# Patient Record
Sex: FEMALE | Race: WHITE | Employment: FULL TIME | URBAN - METROPOLITAN AREA
[De-identification: names, ages, dates, MRNs, and addresses within clinical notes are randomized per-mention and may not be internally consistent; named-entity substitution may affect disease eponyms.]

---

## 2017-12-13 ENCOUNTER — APPOINTMENT (OUTPATIENT)
Dept: CT IMAGING | Facility: CLINIC | Age: 47
DRG: 291 | End: 2017-12-13
Attending: EMERGENCY MEDICINE
Payer: COMMERCIAL

## 2017-12-13 ENCOUNTER — APPOINTMENT (OUTPATIENT)
Dept: GENERAL RADIOLOGY | Facility: CLINIC | Age: 47
DRG: 291 | End: 2017-12-13
Attending: EMERGENCY MEDICINE
Payer: COMMERCIAL

## 2017-12-13 ENCOUNTER — HOSPITAL ENCOUNTER (INPATIENT)
Facility: CLINIC | Age: 47
LOS: 6 days | Discharge: HOME | DRG: 291 | End: 2017-12-20
Attending: EMERGENCY MEDICINE | Admitting: INTERNAL MEDICINE
Payer: COMMERCIAL

## 2017-12-13 DIAGNOSIS — R06.02 SOB (SHORTNESS OF BREATH): ICD-10-CM

## 2017-12-13 DIAGNOSIS — I48.91 NEW ONSET A-FIB (H): ICD-10-CM

## 2017-12-13 DIAGNOSIS — I48.91 ATRIAL FIBRILLATION, UNSPECIFIED TYPE (H): ICD-10-CM

## 2017-12-13 DIAGNOSIS — R09.02 HYPOXIA: ICD-10-CM

## 2017-12-13 DIAGNOSIS — R06.89 HYPERCARBIA: ICD-10-CM

## 2017-12-13 LAB
ALBUMIN SERPL-MCNC: 2.9 G/DL (ref 3.4–5)
ALP SERPL-CCNC: 105 U/L (ref 40–150)
ALT SERPL W P-5'-P-CCNC: 21 U/L (ref 0–50)
ANION GAP SERPL CALCULATED.3IONS-SCNC: 6 MMOL/L (ref 3–14)
AST SERPL W P-5'-P-CCNC: 21 U/L (ref 0–45)
BASOPHILS # BLD AUTO: 0 10E9/L (ref 0–0.2)
BASOPHILS NFR BLD AUTO: 0.4 %
BILIRUB SERPL-MCNC: 1.2 MG/DL (ref 0.2–1.3)
BUN SERPL-MCNC: 14 MG/DL (ref 7–30)
CA-I BLD-SCNC: 4.5 MG/DL (ref 4.4–5.2)
CALCIUM SERPL-MCNC: 8.1 MG/DL (ref 8.5–10.1)
CHLORIDE SERPL-SCNC: 104 MMOL/L (ref 94–109)
CO2 BLDCOV-SCNC: 35 MMOL/L (ref 21–28)
CO2 SERPL-SCNC: 34 MMOL/L (ref 20–32)
CREAT SERPL-MCNC: 0.72 MG/DL (ref 0.52–1.04)
D DIMER PPP FEU-MCNC: 2.9 UG/ML FEU (ref 0–0.5)
DIFFERENTIAL METHOD BLD: ABNORMAL
EOSINOPHIL # BLD AUTO: 0.2 10E9/L (ref 0–0.7)
EOSINOPHIL NFR BLD AUTO: 1.6 %
ERYTHROCYTE [DISTWIDTH] IN BLOOD BY AUTOMATED COUNT: 18.6 % (ref 10–15)
FLUAV+FLUBV AG SPEC QL: NEGATIVE
FLUAV+FLUBV AG SPEC QL: NEGATIVE
GFR SERPL CREATININE-BSD FRML MDRD: 86 ML/MIN/1.7M2
GLUCOSE BLD-MCNC: 93 MG/DL (ref 70–99)
GLUCOSE BLDC GLUCOMTR-MCNC: 87 MG/DL (ref 70–99)
GLUCOSE SERPL-MCNC: 92 MG/DL (ref 70–99)
HCT VFR BLD AUTO: 48.1 % (ref 35–47)
HCT VFR BLD CALC: 49 %PCV (ref 35–47)
HGB BLD CALC-MCNC: 16.7 G/DL (ref 11.7–15.7)
HGB BLD-MCNC: 13.3 G/DL (ref 11.7–15.7)
IMM GRANULOCYTES # BLD: 0 10E9/L (ref 0–0.4)
IMM GRANULOCYTES NFR BLD: 0.4 %
INR PPP: 1.17 (ref 0.86–1.14)
LYMPHOCYTES # BLD AUTO: 1.5 10E9/L (ref 0.8–5.3)
LYMPHOCYTES NFR BLD AUTO: 15 %
MCH RBC QN AUTO: 23 PG (ref 26.5–33)
MCHC RBC AUTO-ENTMCNC: 27.7 G/DL (ref 31.5–36.5)
MCV RBC AUTO: 83 FL (ref 78–100)
MONOCYTES # BLD AUTO: 0.6 10E9/L (ref 0–1.3)
MONOCYTES NFR BLD AUTO: 5.9 %
NEUTROPHILS # BLD AUTO: 7.9 10E9/L (ref 1.6–8.3)
NEUTROPHILS NFR BLD AUTO: 76.7 %
NRBC # BLD AUTO: 0.2 10*3/UL
NRBC BLD AUTO-RTO: 2 /100
NT-PROBNP SERPL-MCNC: 1908 PG/ML (ref 0–450)
PCO2 BLDV: 66 MM HG (ref 40–50)
PH BLDV: 7.33 PH (ref 7.32–7.43)
PLATELET # BLD AUTO: 179 10E9/L (ref 150–450)
PO2 BLDV: 47 MM HG (ref 25–47)
POTASSIUM BLD-SCNC: 4.3 MMOL/L (ref 3.4–5.3)
POTASSIUM SERPL-SCNC: 4.4 MMOL/L (ref 3.4–5.3)
PROT SERPL-MCNC: 8 G/DL (ref 6.8–8.8)
RBC # BLD AUTO: 5.79 10E12/L (ref 3.8–5.2)
SAO2 % BLDV FROM PO2: 78 %
SODIUM BLD-SCNC: 142 MMOL/L (ref 133–144)
SODIUM SERPL-SCNC: 144 MMOL/L (ref 133–144)
SPECIMEN SOURCE: NORMAL
TROPONIN I BLD-MCNC: 0 UG/L (ref 0–0.1)
WBC # BLD AUTO: 10.3 10E9/L (ref 4–11)

## 2017-12-13 PROCEDURE — 96361 HYDRATE IV INFUSION ADD-ON: CPT | Performed by: EMERGENCY MEDICINE

## 2017-12-13 PROCEDURE — 82803 BLOOD GASES ANY COMBINATION: CPT

## 2017-12-13 PROCEDURE — 40000502 ZZHCL STATISTIC GLUCOSE ED POCT

## 2017-12-13 PROCEDURE — 85025 COMPLETE CBC W/AUTO DIFF WBC: CPT | Performed by: EMERGENCY MEDICINE

## 2017-12-13 PROCEDURE — 40000497 ZZHCL STATISTIC SODIUM ED POCT

## 2017-12-13 PROCEDURE — 99285 EMERGENCY DEPT VISIT HI MDM: CPT | Mod: 25 | Performed by: EMERGENCY MEDICINE

## 2017-12-13 PROCEDURE — 25000128 H RX IP 250 OP 636: Performed by: EMERGENCY MEDICINE

## 2017-12-13 PROCEDURE — 96374 THER/PROPH/DIAG INJ IV PUSH: CPT | Mod: 59 | Performed by: EMERGENCY MEDICINE

## 2017-12-13 PROCEDURE — 85379 FIBRIN DEGRADATION QUANT: CPT | Performed by: EMERGENCY MEDICINE

## 2017-12-13 PROCEDURE — 71260 CT THORAX DX C+: CPT

## 2017-12-13 PROCEDURE — 84484 ASSAY OF TROPONIN QUANT: CPT

## 2017-12-13 PROCEDURE — 71010 XR CHEST PORT 1 VW: CPT

## 2017-12-13 PROCEDURE — 00000146 ZZHCL STATISTIC GLUCOSE BY METER IP

## 2017-12-13 PROCEDURE — 87804 INFLUENZA ASSAY W/OPTIC: CPT | Performed by: EMERGENCY MEDICINE

## 2017-12-13 PROCEDURE — 93010 ELECTROCARDIOGRAM REPORT: CPT | Mod: Z6 | Performed by: EMERGENCY MEDICINE

## 2017-12-13 PROCEDURE — 40000498 ZZHCL STATISTIC POTASSIUM ED POCT

## 2017-12-13 PROCEDURE — 85610 PROTHROMBIN TIME: CPT | Performed by: EMERGENCY MEDICINE

## 2017-12-13 PROCEDURE — 25000125 ZZHC RX 250: Performed by: EMERGENCY MEDICINE

## 2017-12-13 PROCEDURE — 82330 ASSAY OF CALCIUM: CPT

## 2017-12-13 PROCEDURE — 40000501 ZZHCL STATISTIC HEMATOCRIT ED POCT

## 2017-12-13 PROCEDURE — 80053 COMPREHEN METABOLIC PANEL: CPT | Performed by: EMERGENCY MEDICINE

## 2017-12-13 PROCEDURE — 83880 ASSAY OF NATRIURETIC PEPTIDE: CPT | Performed by: EMERGENCY MEDICINE

## 2017-12-13 PROCEDURE — 93005 ELECTROCARDIOGRAM TRACING: CPT | Mod: 76 | Performed by: EMERGENCY MEDICINE

## 2017-12-13 RX ORDER — IOPAMIDOL 755 MG/ML
100 INJECTION, SOLUTION INTRAVASCULAR ONCE
Status: COMPLETED | OUTPATIENT
Start: 2017-12-13 | End: 2017-12-14

## 2017-12-13 RX ORDER — ALBUTEROL SULFATE 0.83 MG/ML
2.5 SOLUTION RESPIRATORY (INHALATION)
Status: DISCONTINUED | OUTPATIENT
Start: 2017-12-13 | End: 2017-12-14 | Stop reason: CLARIF

## 2017-12-13 RX ORDER — IPRATROPIUM BROMIDE AND ALBUTEROL SULFATE 2.5; .5 MG/3ML; MG/3ML
3 SOLUTION RESPIRATORY (INHALATION) ONCE
Status: COMPLETED | OUTPATIENT
Start: 2017-12-13 | End: 2017-12-13

## 2017-12-13 RX ORDER — DILTIAZEM HYDROCHLORIDE 5 MG/ML
10 INJECTION INTRAVENOUS ONCE
Status: COMPLETED | OUTPATIENT
Start: 2017-12-13 | End: 2017-12-13

## 2017-12-13 RX ADMIN — DILTIAZEM HYDROCHLORIDE 10 MG: 5 INJECTION INTRAVENOUS at 23:16

## 2017-12-13 RX ADMIN — SODIUM CHLORIDE 500 ML: 9 INJECTION, SOLUTION INTRAVENOUS at 22:52

## 2017-12-13 RX ADMIN — IPRATROPIUM BROMIDE AND ALBUTEROL SULFATE 3 ML: .5; 3 SOLUTION RESPIRATORY (INHALATION) at 22:49

## 2017-12-13 ASSESSMENT — ENCOUNTER SYMPTOMS
FEVER: 0
COUGH: 1
ABDOMINAL DISTENTION: 1
PALPITATIONS: 0
CHILLS: 0
SHORTNESS OF BREATH: 1

## 2017-12-13 NOTE — IP AVS SNAPSHOT
[address] [address]    Phone:  [phone]                                       After Visit Summary   12/13/2017    Courtney Anderson    MRN: [id number]           After Visit Summary Signature Page     I have received my discharge instructions, and my questions have been answered. I have discussed any challenges I see with this plan with the nurse or doctor.    ..........................................................................................................................................  Patient/Patient Representative Signature      ..........................................................................................................................................  Patient Representative Print Name and Relationship to Patient    ..................................................               ................................................  Date                                            Time    ..........................................................................................................................................  Reviewed by Signature/Title    ...................................................              ..............................................  Date                                                            Time

## 2017-12-13 NOTE — IP AVS SNAPSHOT
MRN:[id number]                      After Visit Summary   12/13/2017    Courtney Anderson    MRN: [id number]           Thank you!     Thank you for choosing [facility] for your care. Our goal is always to provide you with excellent care. Hearing back from our patients is one way we can continue to improve our services. Please take a few minutes to complete the written survey that you may receive in the mail after you visit with us. Thank you!        Patient Information     Date Of Birth          1970        Designated Caregiver       Most Recent Value    Caregiver    Will someone help with your care after discharge? yes    Name of designated caregiver Tamra    Phone number of caregiver [phone]    Caregiver address [address] [facility] [address]      About your hospital stay     You were admitted on:  December 14, 2017 You last received care in the:  [address] [facility]    You were discharged on:  December 20, 2017        Reason for your hospital stay       New onset HF, new onset afib                  Who to Call     For medical emergencies, please call 911.  For non-urgent questions about your medical care, please call your primary care provider or clinic, [phone]          Attending Provider     Provider Specialty    Tressa Ricketts MD Emergency Medicine    ONinfa, Yaquelin Wilkinson MD Emergency Medicine    Can, MD Danny Cardiology       Primary Care Provider Office Phone # Fax #    Steph KARIN Carter [phone]      After Care Instructions     Activity       Your activity upon discharge: activity as tolerated            Diet       Follow this diet upon discharge: Orders Placed This Encounter      Low Saturated Fat Na <2400 mg            Discharge Instructions       Please follow up with your PCP within 1 week with labs. Please follow up with imaging for a cardiac MRI.            Oxygen Adult       [facility] Order 2 liter(s) by nasal cannula continuously  with use of portable tank. Expected treatment length is indefinite (99 months).. Test on conserving device as applicable.    Patients who qualify for home O2 coverage under the CMS guidelines require ABG tests or O2 sat readings obtained closest to, but no earlier than 2 days prior to the discharge, as evidence of the need for home oxygen therapy. Testing must be performed while patient is in the chronic stable state. See notes for O2 sats.    I certify that this patient, Courtney Anderson has been under my care and that I, or a nurse practitioner or physician's assistant working with me, had a face-to-face encounter that meets the face-to-face encounter requirements with this patient on 12/20/2017. The patient, Courtney Anderson was evaluated or treated in whole, or in part, for the following medical condition, which necessitates the use of the ordered oxygen. Treatment Diagnosis: CHF    Attending Provider: Wilber Rhoeds MD  Physician signature: See electronic signature associated with these discharge orders  Date of Order: December 20, 2017                  Follow-up Appointments     Adult [facility] Follow-up and recommended labs and tests       Follow up with primary care provider, Steph Carter, within 7 days for hospital follow- up.  The following labs/tests are recommended: BMP.      Appointments on [place] and/or [facility] (with [facility] or [facility] provider or service). Call [phone] if you haven't heard regarding these appointments within 7 days of discharge.            Follow Up and recommended labs and tests       1. Follow up with [facility] clinic as scheduled  2. Follow up with imaging for cardiac MRI  3. Follow up with pulmonology for sleep evaluation                  Your next 10 appointments already scheduled     Dec 29, 2017  8:30 AM CST   Lab with  LAB    Health [facility] ([facility])    [address] [place] [address] [phone]            Dec 29, 2017   9:00 AM CST   (Arrive by 8:45 AM)   CORE NEW with Jolynn El NP   [facility] ([facility])    [address] [facility] [address] [phone]              Additional Services     Inr Clinic Referral           Pulmonary Medicine Referral       Concern for Obstructive Sleep Apnea, please evaulate                  Future tests that were ordered for you     Basic metabolic panel           MRI Cardiac w/contrast                 Further instructions from your care team       INR draw  Friday 12/22/17  [facility]    Lab  [address] [facility] [address]    Oxygen Provider:  Arranged through [employer], contact number [phone].  If you have any questions for concerns please call the oxygen company directly.          [facility]  [phone]     Fax #[phone] [address] [facility] [address]    Dr Santi Carter     12/26/17   10am  INR management    Goal INR 2-3    Warfarin Instruction     You have started taking a medicine called warfarin. This is a blood-thinning medicine (anticoagulant). It helps prevent and treat blood clots.      Before leaving the hospital, make sure you know how much to take and how long to take it.      You will need regular blood tests to make sure your blood is clotting safely. It is very important to see your doctor for regular blood tests.    Talk to your doctor before taking any new medicine (this includes over-the-counter drugs and herbal products). Many medicines can interact with warfarin. This may cause more bleeding or too much clotting.     Eating a lot of vitamin K--found in green, leafy vegetables--can change the way warfarin works in your body. Do NOT avoid these foods. Instead, try to eat the same amount each day.     Bleeding is the most common side-effect of warfarin. You may notice bleeding gums, a bloody nose, bruises and bleeding longer when you cut yourself. See a doctor  "at once if:   o You cough up blood  o You find blood in your stool (poop)  o You have a deep cut, or a cut that bleeds longer than 10 minutes   o You have a bad cut, hard fall, accident or hit your head (go to urgent care or the emergency room).    For women who can get pregnant: This medicine can harm an unborn baby. Be very careful not to get pregnant while taking this medicine. If you think you might be pregnant, call your doctor right away.    For more information, read \"Guide to Warfarin Therapy,  the booklet you received in the hospital.        General Recommendations To Control Heart Failure When You Get Home       Heart Failure Instructions for Patients and Families: Please read and check off each of these important instructions as you do them when you get home.          Weight and Symptoms       ___ Put a scale in your bathroom.    ___ Post a weight chart or calendar next to your scale.    ___ Weigh yourself everyday as soon as you get up in the morning (before breakfast).  You should only be wearing your pajamas.  Write your weight on the chart/calendar.  ___ Bring your weight chart/calendar with you to all appointments.  ___ Call your doctor or nurse practitioner if you gain 2 pounds (in 1 day) or 5 pounds in (1 week) from your goal  good  weight.  Your good weight is also called your  dry  weight.  Your doctor or nurse will tell you what your good weight should be.    ___ Call your doctor or nurse practitioner if you have shortness of breath that gets worse over time, leg swelling or fatigue.       Medications and Diet       ___ Make sure to take your medication as prescribed.    ___ Bring a current list of your medication and all of your medicine bottles with you to all appointments.    ___ Limit fluids if you still have swelling or shortness of breath, or if your doctor tells you to do so.    ___ Eat less than 2000 mg of sodium (salt) every day. Read food labels, and do not add salt to meals. Remember, " if you eat less salt you retain less fluid.  ___ Follow a heart healthy diet that is low in saturated fat.        Activity and Suggested Lifestyle Changes      ___ Stay active. Talk to your doctor about an exercise program that is safe for your heart.  ___ Stop smoking. Reduce alcohol use.      ___ Lose weight if you are overweight. Extra weight puts a lot of stress on the heart.                 Control for Leg Swelling     ___ Keep your legs elevated (raised) as needed for swelling. If swelling is uncomfortable or elevation doesn t help, ask your doctor about using ACE wraps or support stockings.        What is the [facility].O.R[facility]E. Clinic?  Cardiomyopathy  Optimization  Rehabilitation  Education    The [facility].R[facility] Clinic is a heart failure specialty clinic within [facility].  It is an outpatient disease management program that is based on a phase-by-phase approach, which is tailored to each patient s individual needs.  The cardiologist, nurse practitioner, physician assistant and nurses provide an ongoing outpatient care and treatment plan that guides heart failure and cardiomyopathy patients from evaluation and education to stabilization. This team works with your current primary care doctor and cardiologist to help you:    - Avoid hospitalizations  - Slow the progression of the disease  - Improve length and quality of life  - Know who and when to call if heart failure symptoms appear  - Receive easy access to quality health care and advice  - Better understand your condition and treatment  - Decrease the tremendous cost burden of heart failure care  - Detect future heart problems before they become life threatening                                 Follow-up Appointments     ___ An appointment with the [facility] Clinic may already have been made for you (see section   Your next appointments already scheduled ).  If you have a question about your appointment, would like to speak with a C.O.R.E. nurse, or would like to  "become a [facility].O.R.E. Clinic patient, please call one of the following locations:     - [facility] ([place]):                                             [phone]  - [facility] ([place]):                                            [phone]  - [facility] ([place]):                 [phone]      ___ Your C.O.R.E. Clinic Team will continue to educate you on your heart failure and may adjust medications based on your vital signs, lab work, and how you are feeling.  Therefore, it is very important to bring the following to all C.O.R.E. appointments:    - An accurate list of your medications  - Your medicine bottles  - Your weight chart/calendar                   Pending Results     No orders found from 12/11/2017 to 12/14/2017.            Statement of Approval     Ordered          12/20/17 0942  I have reviewed and agree with all the recommendations and orders detailed in this document.  EFFECTIVE NOW     Approved and electronically signed by:  Josef Nicholson MD             Admission Information     Date & Time Department Dept. Phone    12/13/2017 [address] [facility] [phone]      Your Vitals Were     Blood Pressure Pulse Temperature Respirations Height Weight    93/71 (BP Location: Right arm) 80 97.5  F (36.4  C) (Oral) 20 1.626 m (5' 4\") 166.4 kg (366 lb 14.4 oz)    Pulse Oximetry BMI (Body Mass Index)                91% 62.98 kg/m2          MyChart Information     [email or web address] lets you send messages to your doctor, view your test results, renew your prescriptions, schedule appointments and more. To sign up, go to [email or web address] . Click on \"Log in\" on the left side of the screen, which will take you to the Welcome page. Then click on \"Sign up Now\" on the right side of the page.     You will be asked to enter the access code listed below, as well as some personal information. Please follow the directions to create your username " and password.     Your access code is: [email or web address]  Expires: 3/19/2018  7:30 AM     Your access code will  in 90 days. If you need help or a new code, please call your [place] clinic or [phone].        Care EveryWhere ID     This is your Care EveryWhere ID. This could be used by other organizations to access your [place] medical records  [id number]        Equal Access to Services     ANTOINE GANDHI : Hadii aad ku hadasho Soomaali, waaxda luqadaha, qaybta kaalmada adeegyada, vincenzo westin haygildardoderic gallegosarahkaren chavez . So [facility] [phone].    ATENCIÓN: Si habla español, tiene a shukla disposición servicios gratuitos de asistencia lingüística. Llame al [phone].    We comply with applicable federal civil rights laws and [place] laws. We do not discriminate on the basis of race, color, national origin, age, disability, sex, sexual orientation, or gender identity.               Review of your medicines      START taking        Dose / Directions    acetaZOLAMIDE 250 MG tablet   Commonly known as:  DIAMOX   Used for:  Hypoxia        Dose:  250 mg   Take 1 tablet (250 mg) by mouth daily   Quantity:  30 tablet   Refills:  0       * furosemide 40 MG tablet   Commonly known as:  LASIX   Used for:  SOB (shortness of breath)        Dose:  40 mg   Take 1 tablet (40 mg) by mouth every 24 hours   Quantity:  30 tablet   Refills:  0       * furosemide 80 MG tablet   Commonly known as:  LASIX   Used for:  SOB (shortness of breath)        Dose:  80 mg   Take 1 tablet (80 mg) by mouth daily   Quantity:  30 tablet   Refills:  0       levalbuterol 1.25 MG/3ML neb solution   Commonly known as:  XOPENEX   Used for:  Hypoxia        Dose:  1.25 mg   Take 3 mLs (1.25 mg) by nebulization every 4 hours as needed for wheezing   Quantity:  270 mL   Refills:  0       metoprolol 50 MG tablet   Commonly known as:  LOPRESSOR   Used for:  Atrial fibrillation, unspecified type (H)        Dose:  50 mg   Take 1 tablet (50 mg) by mouth 2  times daily   Quantity:  60 tablet   Refills:  0       Potassium Chloride ER 20 MEQ Tbcr   Used for:  Hypoxia        Dose:  20 mEq   Take 1 tablet (20 mEq) by mouth daily   Quantity:  30 tablet   Refills:  0       warfarin 5 MG tablet   Commonly known as:  COUMADIN   Used for:  New onset a-fib (H)        Dose:  5 mg   Take 1 tablet (5 mg) by mouth daily   Quantity:  30 tablet   Refills:  0       * Notice:  This list has 2 medication(s) that are the same as other medications prescribed for you. Read the directions carefully, and ask your doctor or other care provider to review them with you.         Where to get your medicines      These medications were sent to [facility] [place], [place] - [address]  [address] [facility] [address]     Phone:  [phone]     acetaZOLAMIDE 250 MG tablet    furosemide 40 MG tablet    furosemide 80 MG tablet    levalbuterol 1.25 MG/3ML neb solution    metoprolol 50 MG tablet    Potassium Chloride ER 20 MEQ Tbcr    warfarin 5 MG tablet                Protect others around you: Learn how to safely use, store and throw away your medicines at www.disposemymeds.org.             Medication List: This is a list of all your medications and when to take them. Check marks below indicate your daily home schedule. Keep this list as a reference.      Medications           Morning Afternoon Evening Bedtime As Needed    acetaZOLAMIDE 250 MG tablet   Commonly known as:  DIAMOX   Take 1 tablet (250 mg) by mouth daily   Last time this was given:  250 mg on 12/20/2017  9:32 AM                                   * furosemide 40 MG tablet   Commonly known as:  LASIX   Take 1 tablet (40 mg) by mouth every 24 hours   Last time this was given:  40 mg on 12/20/2017 12:06 PM                                   * furosemide 80 MG tablet   Commonly known as:  LASIX   Take 1 tablet (80 mg) by mouth daily   Last time this was given:  40 mg on 12/20/2017 12:06 PM                                    levalbuterol 1.25 MG/3ML neb solution   Commonly known as:  XOPENEX   Take 3 mLs (1.25 mg) by nebulization every 4 hours as needed for wheezing   Last time this was given:  1.25 mg on 12/16/2017  9:29 AM                                   metoprolol 50 MG tablet   Commonly known as:  LOPRESSOR   Take 1 tablet (50 mg) by mouth 2 times daily   Last time this was given:  50 mg on 12/20/2017  9:33 AM                                      Potassium Chloride ER 20 MEQ Tbcr   Take 1 tablet (20 mEq) by mouth daily                                   warfarin 5 MG tablet   Commonly known as:  COUMADIN   Take 1 tablet (5 mg) by mouth daily   Last time this was given:  7.5 mg on 12/19/2017  6:37 PM                                   * Notice:  This list has 2 medication(s) that are the same as other medications prescribed for you. Read the directions carefully, and ask your doctor or other care provider to review them with you.              More Information      Weight Chart  For Patients with Heart Failure  Instructions: Weigh yourself every morning at the same time, on the same scale and in the same clothing. Write the amount on the weight chart. Bring the chart with you to your clinic visits.  Call your doctor if you:    Gain more than 2 pounds in one day or 5 pounds in one week.    Have increased shortness of breath.    Wake up short of breath or cannot sleep lying down.    Have increased swelling in your legs, ankles or abdomen (belly).   SUN MON TUES WED THURS FRI SAT   Date Weight                    Date Weight                    Date Weight                    Date Weight                    Date Weight                    Date Weight                    Date Weight                    Date Weight                    Date Weight                    Date Weight                    Date Weight                    Instructions: Weigh yourself every morning at the same time, on the same scale and in the same clothing.  Write the amount on the weight chart. Bring the chart with you to your clinic visits.   SUN MON TUES WED THURS FRI SAT   Date Weight                    Date Weight                    Date Weight                    Date Weight                    Date Weight                    Date Weight                    Date Weight                    Date Weight                    Date Weight                    Date Weight                    Date Weight                    Date Weight                    Date Weight                    Date Weight                    Date Weight                    For informational purposes only. Not to replace the advice of your health care provider. Copyright   2014 [facility]. All rights reserved. [employer] 457586 - REV 03/16.            Heart Failure: Know Your Baselines     Your healthcare provider can help you come up with baselines for measuring your symptoms.   The first step to managing heart failure symptoms is getting to know what s normal for you. How much can you usually do before shortness of breath is a problem? Do your socks and shoes fit comfortably? How much do you weigh? How your symptoms usually feel are your baselines. Knowing what s normal for you will help you see when symptoms are getting worse. You ll know because you won t feel normal anymore. Worsening symptoms means your heart is under stress. It is having a hard time pumping blood to the rest of your body and your body may be retaining fluid. Write some baselines in the box below. These will help you measure your symptoms.  Watch for changes  Once you ve come up with baselines, watch for changes daily. Pay attention to how much you can do today. Is it the same as yesterday? Are your shoes tight? Do you need to use a different belt hole? Can you lie flat in bed to sleep without feeling that you are suffocating? Can you eat without feeling full too soon, or short of breath? Are you gaining weight but eating  the same amount?  If today s symptoms are different from your baselines, you need to take action. The problem won t go away by itself. So, if you notice even a small change, don t ignore it. Your healthcare provider is counting on you to call when you think your symptoms are worse. He or she will tell you what to do next. Working together this way helps keep heart failure under control and improves the number of good days you have. It could even keep you out of the hospital.    Date Last Reviewed: 7/1/2016 2000-2017 [employer]. [address] [place] [address]. All rights reserved. This information is not intended as a substitute for professional medical care. Always follow your healthcare professional's instructions.                Heart Failure: Tracking Your Weight  You have a condition called heart failure. When you have heart failure, a sudden weight gain or a steady rise in weight is a warning sign that your body is retaining too much water and salt. This could mean your heart failure is getting worse. If left untreated, it can cause problems for your lungs and result in shortness of breath. Weighing yourself each day is the best way to know if you re retaining water. If your weight goes up quickly, call your doctor. You will be given instructions on how to get rid of the excess water. You will likely need medicines and to avoid salt. This will help your heart work better.  Call your doctor if you gain more than 2 pounds in 1 day, more than 5 pounds in 1 week, or whatever weight gain you were told to report by your doctor. This is often a sign of worsening heart failure and needs to be evaluated and treated. Your doctor will tell you what to do next.   Tips for weighing yourself      Weigh yourself at the same time each morning, wearing the same clothes. Weigh yourself after urinating and before eating.    Use the same scale each day. Make sure the numbers are easy to read. Put the  scale on a flat, hard surface -- not on a rug or carpet.    Do not stop weighing yourself. If you forget one day, weigh again the next morning.  How to use your weight chart    Keep your weight chart near the scale. Write your weight on the chart as soon as you get off the scale.    Fill in the month and the start date on the chart. Then write down your weight each day. Your chart will look like this:      If you miss a day, leave the space blank. Weigh yourself the next day and write your weight in the next space.    Take your weight chart with you when you go to see your doctor.  Date Last Reviewed: 3/20/2016    [id number] [employer]. [address] [place] [address], [place] [address]. All rights reserved. This information is not intended as a substitute for professional medical care. Always follow your healthcare professional's instructions.                Heart Failure: Warning Signs of a Flare-Up  You have a condition called heart failure. Once you have heart failure, flare-ups can happen. Below are signs that can mean your heart failure is getting worse. If you notice any of these warning signs, call your healthcare provider.  Swelling      Your feet, ankles, or lower legs get puffier.    You notice skin changes on your lower legs.    Your shoes feel too tight.    Your clothes are tighter in the waist.    You have trouble getting rings on or off your fingers.  Shortness of breath    You have to breathe harder even when you re doing your normal activities or when you re resting.    You are short of breath walking up stairs or even short distances.    You wake up at night short of breath or coughing.    You need to use more pillows or sit up to sleep.    You wake up tired or restless.  Other warning signs    You feel weaker, dizzy, or more tired.    You have chest pain or changes in your heartbeat.    You have a cough that won t go away.    You can t remember things or don t feel like eating.  Tracking your  weight  Gaining weight is often the first warning sign that heart failure is getting worse. Gaining even a few pounds can be a sign that your body is retaining excess water and salt. Weighing yourself each day in the morning after you urinate and before you eat, is the best way to know if you're retaining water. Get a scale that is easy to read and make sure you wear the same clothes and use the same scale every time you weigh. Your healthcare provider will show you how to track your weight. Call your doctor if you gain more than 2 pounds in 1 day, 5 pounds in 1 week, or whatever weight gain you were told to report by your doctor. This is often a sign of worsening heart failure and needs to be evaluated and treated before it compromises your breathing. Your doctor will tell you what to do next.    Date Last Reviewed: 3/15/2016    5558-6976 [employer]. [address] [place] [address]. All rights reserved. This information is not intended as a substitute for professional medical care. Always follow your healthcare professional's instructions.                Taking Medicine to Control Heart Failure  The heart is a muscle that pumps oxygen-rich blood to all parts of the body. When you have heart failure, the heart is not able to pump as well as it should. Blood and fluid may back up into the lungs (congestive heart failure), and some parts of the body don t get enough oxygen-rich blood to work normally. These problems lead to the symptoms of heart failure.  Medicines can help your heart work better, but follow your doctor's directions exactly to make sure they work as they should.     Have all your prescriptions filled. Talk to a pharmacist if you have questions.     Why take your medicines?    They help you feel better. That means you can do more of the things you enjoy.    They help your heart work better.    They can help you stay out of the hospital.    They can prevent shortness of breath  and swelling in your feet.    They can improve blood flow to the rest of your body and prevent other organs from being affected by your heart's decreased function.  Know your medicines  You may take one or more of the medicines below. Be sure you know which ones you take:    ACE inhibitors lower blood pressure and ease strain on the heart. This makes it easier for the heart to pump. These also help remodel the heart, which can help your heart pump better.    Angiotensin receptor blockers (ARBs) work like ACE inhibitors. These are prescribed for some people who can't take ACE inhibitors. Some people who take ACE inhibitors develop a cough.    Angiotensin receptor neprilysin inhibitors (ARNIs) a new medicine that combines an ARB and a neprilysin inhibitor. It helps to relax blood vessels, reduce stress on the heart, and help your body get rid of salt and fluid.    Beta-blockers help lower blood pressure and slow your heart rate. This eases the work your heart has to do. Beta-blockers may improve the heart s pumping action and strength over time. If you have severe pulmonary disease, you may not be able to take these medicines.    Diuretics ( water pills ) help the body get rid of extra water by excreting salt. This helps prevent swelling, especially in your ankles. They can also help you breathe better if you have fluid in your lungs. Having less fluid to pump means your heart doesn t have to work as hard. A side effect of this medicine is having to urinate more often. Some diuretics make your body lose a mineral called potassium. Your doctor will tell you if you need to take supplements or eat more foods high in potassium.    Digoxin helps your heart pump with more strength. This helps your heart pump more blood with each beat. So more oxygen-rich blood travels to the rest of the body.    Aldosterone blockers help change hormones and ease strain on the heart.    Hydralazine and nitrates are two separate medicines  used together to treat heart failure. They may come in one  combination  pill. They lower blood pressure and decrease how hard the heart has to pump.  Tips for taking your medicine    Take your medicines exactly as directed. Follow the directions on the label.    Take your medicines at the same time or times each day.    If you miss a dose, take it as soon as you remember--unless it s almost time for your next dose. If so, skip the missed dose. Don't take a double dose.    Never change the dose or stop taking a medicine unless your doctor tells you to. Tell your doctor if you don't understand how to take your medicines, or if you are having difficulty getting your medicines.    If you miss too many doses, you are at risk for being admitted to the hospital for shortness of breath and worsening of heart failure symptoms.  Date Last Reviewed: 4/1/2016 2000-2017 The [employer]. [address] [place] [address], [place] [id number]. All rights reserved. This information is not intended as a substitute for professional medical care. Always follow your healthcare professional's instructions.                Heart Failure: Making Changes to Your Diet  You have a condition called heart failure. When you have heart failure, excess fluid is more likely to build up in your body because your heart isn't working well. This makes the heart work harder to pump blood. Fluid buildup causes symptoms such as shortness of breath and swelling (edema). This is often referred to as congestive heart failure or CHF. Controlling the amount of salt (sodium) you eat may help stop fluid from building up. Your doctor may also tell you to reduce the amount of fluid you drink.  Reading food labels    Your healthcare provider will tell you how much sodium you can eat each day. Read food labels to keep track. Keep in mind that certain foods are high in salt. These include canned, frozen, and processed foods. Check the amount of sodium in each  serving. Watch out for high-sodium ingredients. These include MSG (monosodium glutamate), baking soda, and sodium phosphate.   Eating less salt  Give yourself time to get used to eating less salt. It may take a little while. Here are some tips to help:    Take the saltshaker off the table. Replace it with salt-free herb mixes and spices.    Eat fresh or plain frozen vegetables. These have much less salt than canned vegetables.    Choose low-sodium snacks like sodium-free pretzels, crackers, or air-popped popcorn.    Don t add salt to your food when you re cooking. Instead, season your foods with pepper, lemon, garlic, or onion.    When you eat out, ask that your food be cooked without added salt.    Avoid eating fried foods as these often have a great deal of salt.  If you re told to limit fluids  You may need to limit how much fluid you have to help prevent swelling. This includes anything that is liquid at room temperature, such as ice cream and soup. If your doctor tells you to limit fluid, try these tips:    Measure drinks in a measuring cup before you drink them. This will help you meet daily goals.    Chill drinks to make them more refreshing.    Suck on frozen lemon wedges to quench thirst.    Only drink when you re thirsty.    Chew sugarless gum or suck on hard candy to keep your mouth moist.    Weigh yourself daily to know if your body's fluid content is rising.  My sodium goal  Your healthcare provider may give you a sodium goal to meet each day. This includes sodium found in food as well as salt that you add. My goal is to eat no more than ___________ mg of sodium per day.     When to call your doctor  Call your doctor right away if you have any symptoms of worsening heart failure. These can include:    Sudden weight gain    Increased swelling of your legs or ankles    Trouble breathing when you re resting or at night    Increase in the number of pillows you have to sleep on    Chest pain, pressure,  discomfort, or pain in the jaw, neck, or back   Date Last Reviewed: 3/21/2016    6043-3181 The [employer]. [address] [place] [address], [place] [address]. All rights reserved. This information is not intended as a substitute for professional medical care. Always follow your healthcare professional's instructions.                Heart Failure: Evaluating Your Heart  You have a condition called heart failure. To evaluate your condition, your doctor will examine you, ask questions, and do some tests. Along with looking for signs of heart failure, the doctor looks for any other health problems that may have led to heart failure. The results of your evaluation will help your doctor form a treatment plan.  Health history and physical exam  Your visit will start with a health history. Tell the doctor about any symptoms you ve noticed and about all medicines you take. Then you ll have a physical exam. This includes listening to your heartbeat and breathing. You ll also be checked for swelling (edema) in your legs and neck. When you have fluid buildup or fluid in the lungs, it may be called congestive heart failure.  Diagnosing heart failure     During an echocardiogram, sound waves bounce off the heart. These are converted into a picture on the screen.   The following may be done to help your doctor form a diagnosis:    X-rays show the size and shape of your heart. These pictures can also show fluid in your lungs.    An electrocardiogram (ECG or EKG) shows the pattern of your heartbeat. Small pads (electrodes) are placed on your chest, arms, and legs. Wires connect the pads to the ECG machine, which records your heart s electrical signals. This can give the doctor information about heart function.    An echocardiogram uses ultrasound waves to show the structure and movement of your heart muscle. This shows how well the heart pumps. It also shows the thickness of the heart walls, and if the heart is enlarged. It is one  of the most useful, non-invasive tests as it provides information about the heart's general function. This helps your doctor make treatment decisions.    Lab tests evaluate small amounts of blood or urine for signs of problems. A BNP lab test can help diagnose and evaluate heart failure. BNP stands for B-type natriuretic peptide. The ventricles secrete more BNP when heart failure worsens. Lab tests can also provide information about metabolic dysfunction or heart dysfunction.  Your treatment plan  Based on the results of your evaluation and tests, your doctor will develop a treatment plan. This plan is designed to relieve some of your heart failure symptoms and help make you more comfortable. Your treatment plan may include:    Medicine to help your heart work better and improve your quality of life    Changes in what you eat and drink to help prevent fluid from backing up in your body    Daily monitoring of your weight and heart failure symptoms to see how well your treatment plan is working    Exercise to help you stay healthy    Help with quitting smoking    Emotional and psychological support to help adjust to the changes    Referrals to other specialists to make sure you are being treated comprehensively  Date Last Reviewed: 3/21/2016    [id number] The [employer]. [address] [place] [address]. All rights reserved. This information is not intended as a substitute for professional medical care. Always follow your healthcare professional's instructions.                Heart Failure: Being Active  You have a condition called heart failure. Being active doesn t mean that you have to wear yourself out. Even a little movement each day helps to strengthen your heart. If you can t get out to exercise, you can do simple stretching and strengthening exercises at home. These are good ways to keep you well-conditioned and prevent you and your heart from becoming excessively weak.    Ideas to get you  started    Add a little movement to things you do now. Walk to mail letters. Park your car at the far end of the parking lot and walk to the store. Walk up a flight of stairs instead of taking the elevator.    Choose activities you enjoy. You might walk, swim, or ride an exercise bike. Things like gardening and washing the car count, too. Other possibilities include: washing dishes, walking the dog, walking around the mall, and doing aerobic activities with friends.    Join a group exercise program at a [facility] or [facility], a senior center, or a community center. Or look into a hospital cardiac rehabilitation program. Ask your doctor if you qualify.  Tips to keep you going    Get up and get dressed each day. Go to a coffee shop and read a newspaper or go somewhere that you'll be in the presence of other active people. You ll feel more like being active.    Make a plan. Choose one or more activities that you enjoy and that you can easily do. Then plan to do at least one each day. You might write your plan on a calendar.    Go with a friend or a group if you like company. This can help you feel supported and stay motivated, too.    Plan social events that you enjoy. This will keep you mentally engaged as well as physically motivated to do things you find pleasure in.  For your safety    Talk with your healthcare provider before starting an exercise program.    Exercise indoors when it s too hot or too cold outside, or when the air quality is poor. Try walking at a shopping mall.    Wear socks and sturdy shoes to maintain your balance and prevent falls.    Start slowly. Do a few minutes several times a day at first. Increase your time and speed little by little.    Stop and rest whenever you feel tired or get short of breath.    Don t push yourself on days when you don t feel well.  Date Last Reviewed: 3/20/2016    [id number] [employer]. [address] [facility], [place] [address]. All rights reserved. This  information is not intended as a substitute for professional medical care. Always follow your healthcare professional's instructions.                Left- or Right- Side Congestive Heart Failure (CHF)    The heart is a large muscle. It is a pump that circulates blood throughout the body. Blood carries oxygen to all of the organs, including the brain, muscles, and skin. After your body takes the oxygen out of the blood, the blood returns to the heart. The right side of the heart collects the blood from the body and pumps it to the lungs. In the lungs, it gets fresh oxygen and gives up carbon dioxide. The oxygen-rich blood from the lungs then returns to the left side of the heart, where it is pumped back out to the rest of your body, starting the process all over.  Congestive heart failure (CHF) occurs when the heart muscle is weakened. This affects the pumping action of the heart. Heart failure can affect the right side of the heart or the left side. But heart failure may affect not only the right side of the heart or only the left side. Although it may have started on one side, it can and often eventually does affect both sides.  Right-side heart failure  When the right side of the heart is weakened, it can t handle the blood it is getting from the rest of the body. This blood returns to the heart through veins. When too much pressure builds up in the veins, fluid leaks out into the tissues. Gravity then causes that fluid to move to those parts of the body that are the lowest. So one of the first symptoms of right-side CHF can include swelling in the feet and ankles. If the condition gets worse, the swelling can even go up past the knees. Sometimes it gets so severe, the liver can get congested as well.  Left-side heart failure  When the left side of the heart is weakened, it can t handle the blood it gets from the lungs. Pressure then builds up in the veins of the lungs, causing fluid to leak into the lung tissues.  This may cause CHF and pulmonary edema. This causes you to feel short of breath, weak, or dizzy. These symptoms are often worse with exertion, such as when climbing stairs or walking up hills. Lying with your head flat is uncomfortable and can make your breathing worse. This may make sleeping difficult. You may need to use extra pillows to elevate your upper body to sleep well. The same is true when just resting during the daytime.  There are many causes of heart failure including:    Coronary artery disease    Past heart attack (also known as acute myocardial infarction, or AMI)    High blood pressure    Damaged heart valve    Diabetes    Obesity    Cigarette smoking    Alcohol abuse  Heart failure is a chronic condition. There is no cure. The purpose of medical treatment is to improve the pumping action of the heart. The main way to do this is to remove excess water from the body. A number of medicines can help reach this goal, improve symptoms, and prevent the heart from becoming weaker. Sometimes, heart failure can become so severe that a device is placed in the heart to help with pumping. Another major goal is to better treat the causes of heart failure, such as diabetes and high blood pressure, by making changes in your lifestyle and maximizing medical control when needed.  Home care  Follow these guidelines when caring for yourself at home:    Check your weight every day. This is very important because a sudden increase in weight gain could mean worsening heart failure. Keep these things in mind:    Use the same scale every day.    Weigh yourself at the same time every day.    Make sure the scale is on a hard floor surface, not on a rug or carpet.    Keep a record of your weight every day so your healthcare provider can see it. If you are not given a log sheet for this, keep a separate journal for this purpose.     Cut back on the amount of salt (sodium) you eat. Follow your healthcare provider's  recommendation on how much salt or sodium you should have each day.    Avoid high-salt foods. These include olives, pickles, smoked meats, salted potato chips, and most prepared foods.    Don't add salt to your food at the table. Use only small amounts of salt when cooking.    Read the labels carefully on food packages to learn how much salt or sodium is in each serving in the package. Remember, a can or package of food may contain more than 1 serving. So if you eat all the food in the package, you may be getting more salt than you think.    Follow your healthcare provider's recommendations about how much fluid you should have. Be aware that some foods, such as soup, pudding, and juicy fruits like oranges or melons, contain liquid. You'll need to count the liquid in those foods as part of your daily fluid intake. Your provider can help you with this.    Stop smoking.    Cut back on how much alcohol you drink.    Lose weight if you are overweight. The excess weight adds a lot of stress on the workload of the heart.    Stay active. Talk with your provider about an exercise program that is safe for your heart.    Keep your feet elevated to reduce swelling. Ask your provider about support hose as a preventive treatment for daytime leg swelling.  Besides taking your medicine as instructed, an important part of treatment is lifestyle changes. These include diet, physical activity, stopping smoking, and weight control.  Improve your diet by including more fresh foods, cutting back on how much sugar and saturated fat you eat, and eating fewer processed foods and less salt.  Follow-up care  Follow up with your healthcare provider, or as advised.  Make sure to keep any appointments that were made for you. These can help better control your congestive heart failure. You will need to follow up with your provider on a routine basis to make sure your heart failure is well managed.  If an X-ray, ECG, or other tests were done, you  will be told of any new findings that may affect your care.  Call 911  Call 911 if you:    Become severely short of breath    Feel lightheaded, or feel like you might pass out or faint    Have chest pain or discomfort that is different than usual, the medicines your doctor told you to use for this don't help, or the pain lasts longer than 10 to 15 minutes    You suddenly develop a rapid heart rate  When to seek medical advice  The following may be signs that your heart failure is getting worse. Call your healthcare provider right away if any of these happen:    Sudden weight gain. This means 3 or more pounds in one day, or 5 or more pounds in 1 week.    Trouble breathing not related to being active    New or increased swelling of your legs or ankles    Swelling or pain in your abdomen    Breathing trouble at night. This means waking up short of breath or needing more pillows to breathe.    Frequent coughing that doesn t go away    Feeling much more tired than usual  Date Last Reviewed: 1/4/2016 2000-2017 [employer]. [address] [place] [address]. All rights reserved. This information is not intended as a substitute for professional medical care. Always follow your healthcare professional's instructions.                Discharge Instructions for Heart Failure  The heart is a muscle that pumps oxygen-rich blood to all parts of the body. When you have heart failure, the heart is not able to pump as well as it should. Blood and fluid may back up into the lungs (congestive heart failure), and some parts of the body don t get enough oxygen-rich blood to work normally. These problems lead to the symptoms of heart failure. Heart failure can occur due to an injury to the heart or from natural processes.  You can control symptoms of heart failure with some lifestyle changes and by following your doctor's advice.  Home care  Activity  Ask your healthcare provider about an exercise program. You can  benefit from simple activities such as walking or gardening. Exercising most days of the week can make you feel better. Don't be discouraged if your progress is slow at first. Rest as needed. Stop activity if you develop symptoms such as chest pain, lightheadedness, or significant shortness of breath. Find activities that you enjoy, such as brisk walking, dancing, swimming, or gardening. These will help you stay active and strengthen your heart.  Diet  Follow a heart healthy diet. And make sure to limit the salt (sodium) in your diet. Salt causes your body to hold water. This makes your heart work harder as there is more fluid for the heart to pump. Limit your salt by doing the following:    Limit canned, dried, packaged, and fast foods.    Don't add salt to your food.    Season foods with herbs instead of salt.    Watch how much liquids you drink. Drinking too much can make heart failure worse. Talk with your health care provider about how much you should drink each day.    Limit the amount of alcohol you drink. It may harm your heart. Women should have no more than 1 drink a day and men should have no more than 2.    When you eat out, request that your meals have no added salt.  Tobacco  If you smoke, it's very important to quit. Smoking increases your chances of having a heart attack by harming the blood vessels that provide oxygen to your heart. This makes heart failure worse. Quitting smoking is the number one thing you can do to improve your health. Enroll in a stop-smoking program to improve your chances of success. Talk with your healthcare provider about medicines or nicotine replacement therapy to help you quit smoking. Ask your healthcare provider about smoking cessation support groups.  Medicine  Take your medicines exactly as prescribed. Learn the names and purpose of each of your medicines. Keep an accurate medicine list and current dosages with you at all times. Don't skip doses. If you miss a dose of  your medicine, take it as soon as you remember. If you miss a dose and it's almost time for your next dose, just wait and take your next dose at the normal time. Don't take a double dose. If you are unsure, call your doctor's office. Make sure not to mix up your medicines or forget what you've taken the same day.  Weight monitoring  Weigh yourself every day. A sudden weight gain can mean your heart failure is getting worse. Weigh yourself at the same time of day and in the same kind of clothes. Ideally, weigh yourself first thing in the morning after you empty your bladder, but before you eat breakfast. Your healthcare provider will show you how to track your weight. He or she will also discuss with you when you should call if you have a sudden, unexpected increase in your weight.  In general, your healthcare provider may ask you to report if your weight goes up by more than 2 pounds in 1 day,  5 pounds in 1 week, or whatever weight gain you were told by your doctor. This is a sign that you are retaining more fluid than you should be. Clues to weight gain include checking your ankles for swelling, or noticing you are short of breath when you lie down.  Follow-up care  Make a follow-up appointment as directed. Depending on the type and severity of heart failure you have, you may need follow-up as early as 7 days from hospital discharge. Keep appointments for checkups and lab tests that are needed to check your medicines and condition.  Recognize that your health and even survival depend on your following medical recommendations.  Symptoms  Heart failure can cause a variety of symptoms, including:    Shortness of breath    Trouble breathing at night, especially when you lie down    Swelling in the legs and feet or in the belly (abdomen)    Becoming easily fatigued    Irregular or rapid heartbeat    Weakness or lightheadedness    Swelling of the neck veins  It is important to know what to do if symptoms get worse or if  you develop signs of worsening heart failure.     When to see your healthcare provider  Call your doctor right away if you have any of these signs of worsening heart failure:    Sudden weight gain (more than 2 pounds in 1 day or 5 pounds in 1 week, or whatever weight gain you were told to report by your doctor)    Trouble breathing not related to being active    New or increased swelling of your legs or ankles    Swelling or pain in your abdomen    Breathing trouble at night (waking up short of breath, needing more pillows to breathe)    Frequent coughing that doesn't go away    Feeling much more tired than usual  Call 911  Call 911 right away if you have:    Severe shortness of breath, such that you can't catch your breath even while resting    Severe chest pain that does not resolve with rest or nitroglycerin    Pink, foamy mucus with cough and shortness of breath    A continuous rapid or irregular heartbeat    Passing out or fainting    Stroke symptoms such as sudden numbness or weakness on one side of your face, arm, or leg or sudden confusion, trouble speaking or vision changes   Date Last Reviewed: 3/21/2016    8800-6661 [employer]. [address] [place] [address]. All rights reserved. This information is not intended as a substitute for professional medical care. Always follow your healthcare professional's instructions.                What is Heart Failure?  The heart is a muscle that pumps oxygen-rich blood to all parts of the body. When you have heart failure, the heart is not able to pump as well as it should. Blood and fluid may back up into the lungs, and some parts of the body don t get enough oxygen-rich blood to work normally. These problems lead to the symptoms you feel.  When you have heart failure  With heart failure, not enough oxygen-rich blood leaves the heart with each beat. There are 2 types of heart failure. Both affect the ventricles  ability to pump blood. You may  have 1 or both types.       Systolic heart failure. The heart muscle becomes weak and enlarged. It can t pump enough oxygen-rich blood forward to the rest of the body when the ventricles contract. The measurement of how much blood your heart pushes out when it beats is called ejection fraction. In systolic heart failure, the ejection fraction is lower than normal. This can cause blood to back up into the lungs and cause shortness of breath and eventually ankle swelling (edema).  This is also called heart failure with reduced ejection fraction, or  HFrEF.    Diastolic heart failure. The heart muscle becomes stiff. It doesn t relax normally between contractions, which keeps the ventricles from filling with blood. Ejection fraction is often in the normal range. This can still lead to the backup of blood into the body and affect the organs such as the liver. This is also called heart failure with preserved ejection fraction or HFpEF.     Recognizing heart failure symptoms  When you have heart failure, you need to pay close attention to your body and how you feel, every single day. That way, if a problem occurs, you can get help before it becomes too severe. You'll need to watch for changes in your symptoms. As long as symptoms stay about the same from one day to the next, your heart failure is stable. But if symptoms start to get worse, it's time to take action.  Signs and symptoms of worsening heart failure include:    Rapid weight gain    Shortness of breath    Swelling (edema)    Fatigue  How heart failure affects your body  When the heart doesn't pump enough blood, hormones (body chemicals) are sent to increase the amount of work the heart does. Some hormones make the heart grow larger. Others tell the heart to pump faster. As a result, the heart may pump more blood at first, but it can't keep up with the ongoing demands. So, the heart muscle becomes even more weak. Over time, even less blood is pumped through the  heart. This leads to problems throughout the body as organs began to feel the effects of a long-term lack of oxygen. Eventually, if untreated, this can cause problems with your lungs, liver, kidneys, and loss of elasticity in the skin, and skin changes in the lower legs. A weak heart itself can eventually cause a severe decline in health and possible death if left untreated.  What is ejection fraction?  Ejection fraction (EF) measures how much blood the heart pumps out (ejects). This is measured to help diagnose heart failure. A healthy heart pumps at least half of the blood from the ventricles with each beat. This means a normal ejection fraction is around 50% to 70%. Your doctor will calculate ejection fraction from an echocardiogram.     My ejection fraction     Date: ______________________  Ejection fraction: _____________  Test used: __________________  Date Last Reviewed: 3/15/2016    6601-0463 [employer]. [address] [place] [address]. All rights reserved. This information is not intended as a substitute for professional medical care. Always follow your healthcare professional's instructions.                Heart Failure: Medicines to Help Your Heart    You have a condition called heart failure (also known as congestive heart failure, or CHF). Your doctor will likely prescribe medicines for heart failure and any underlying health problems you have. Most heart failure patients take one or more types of medicinen. Your healthcare provider will work to find the combination of medicines that works best for you.  Heart failure medicines  Here are the most common heart failure medicines:    ACE inhibitors lower blood pressure and decrease strain on the heart. This makes it easier for the heart to pump. Angiotensin receptor blockers have similar effects. These are prescribed for some patients instead of ACE inhibitors.    Beta-blockers relieve stress on the heart. They also improve  symptoms. They may also improve the heart's pumping action over time.    Diuretics (also called  water pills ) help rid your body of excess water. This can help rid your body of swelling (edema). Having less fluid to pump means your heart doesn t have to work as hard. Some diuretics make your body lose a mineral called potassium. Your doctor will tell you if you need to take supplements or eat more foods high in potassium.    Digoxin helps your heart pump with more strength. This helps your heart pump more blood with each beat. So, more oxygen-rich blood travels to the rest of the body.    Aldosterone antagonists help alter hormones and decrease strain on the heart.    Hydralazine and nitrates are two separate medicines used together to treat heart failure. They may come in one  combination  pill. They lower blood pressure and decrease how hard the heart has to pump.  Medicines for related conditions  Controlling other heart problems helps keep heart failure under control, too. Depending on other heart problems you have, medicines may be prescribed to:    Lower blood pressure (antihypertensives).    Lower cholesterol levels (statins).    Prevent blood clots (anticoagulants or aspirin).    Keep the heartbeat steady (antiarrhythmics).  Date Last Reviewed: 3/5/2016    8461-8722 [employer] [address] [place] [address], [place] [address]. All rights reserved. This information is not intended as a substitute for professional medical care. Always follow your healthcare professional's instructions.

## 2017-12-14 ENCOUNTER — APPOINTMENT (OUTPATIENT)
Dept: CARDIOLOGY | Facility: CLINIC | Age: 47
DRG: 291 | End: 2017-12-14
Attending: INTERNAL MEDICINE
Payer: COMMERCIAL

## 2017-12-14 PROBLEM — I48.91 ATRIAL FIBRILLATION (H): Status: ACTIVE | Noted: 2017-12-14

## 2017-12-14 LAB
ALBUMIN UR-MCNC: NEGATIVE MG/DL
ANION GAP SERPL CALCULATED.3IONS-SCNC: 3 MMOL/L (ref 3–14)
ANION GAP SERPL CALCULATED.3IONS-SCNC: 5 MMOL/L (ref 3–14)
APPEARANCE UR: CLEAR
BASE EXCESS BLDA CALC-SCNC: 10.1 MMOL/L
BASE EXCESS BLDA CALC-SCNC: 11.6 MMOL/L
BASE EXCESS BLDA CALC-SCNC: 8.8 MMOL/L
BILIRUB UR QL STRIP: NEGATIVE
BUN SERPL-MCNC: 14 MG/DL (ref 7–30)
BUN SERPL-MCNC: 14 MG/DL (ref 7–30)
CALCIUM SERPL-MCNC: 8.2 MG/DL (ref 8.5–10.1)
CALCIUM SERPL-MCNC: 8.3 MG/DL (ref 8.5–10.1)
CHLORIDE SERPL-SCNC: 100 MMOL/L (ref 94–109)
CHLORIDE SERPL-SCNC: 98 MMOL/L (ref 94–109)
CO2 SERPL-SCNC: 36 MMOL/L (ref 20–32)
CO2 SERPL-SCNC: 40 MMOL/L (ref 20–32)
COLOR UR AUTO: ABNORMAL
CREAT SERPL-MCNC: 0.8 MG/DL (ref 0.52–1.04)
CREAT SERPL-MCNC: 0.87 MG/DL (ref 0.52–1.04)
ERYTHROCYTE [DISTWIDTH] IN BLOOD BY AUTOMATED COUNT: 19.4 % (ref 10–15)
GFR SERPL CREATININE-BSD FRML MDRD: 69 ML/MIN/1.7M2
GFR SERPL CREATININE-BSD FRML MDRD: 76 ML/MIN/1.7M2
GLUCOSE SERPL-MCNC: 110 MG/DL (ref 70–99)
GLUCOSE SERPL-MCNC: 117 MG/DL (ref 70–99)
GLUCOSE UR STRIP-MCNC: NEGATIVE MG/DL
HCO3 BLD-SCNC: 38 MMOL/L (ref 21–28)
HCO3 BLD-SCNC: 40 MMOL/L (ref 21–28)
HCO3 BLD-SCNC: 41 MMOL/L (ref 21–28)
HCT VFR BLD AUTO: 48.1 % (ref 35–47)
HGB BLD-MCNC: 12.6 G/DL (ref 11.7–15.7)
HGB UR QL STRIP: NEGATIVE
HYALINE CASTS #/AREA URNS LPF: 5 /LPF (ref 0–2)
INR PPP: 1.2 (ref 0.86–1.14)
INTERPRETATION ECG - MUSE: NORMAL
INTERPRETATION ECG - MUSE: NORMAL
KETONES UR STRIP-MCNC: NEGATIVE MG/DL
L PNEUMO1 AG UR QL IA: NORMAL
LACTATE BLD-SCNC: 1 MMOL/L (ref 0.7–2)
LEUKOCYTE ESTERASE UR QL STRIP: NEGATIVE
MAGNESIUM SERPL-MCNC: 2 MG/DL (ref 1.6–2.3)
MAGNESIUM SERPL-MCNC: 2.1 MG/DL (ref 1.6–2.3)
MCH RBC QN AUTO: 22.3 PG (ref 26.5–33)
MCHC RBC AUTO-ENTMCNC: 26.2 G/DL (ref 31.5–36.5)
MCV RBC AUTO: 85 FL (ref 78–100)
MUCOUS THREADS #/AREA URNS LPF: PRESENT /LPF
NITRATE UR QL: NEGATIVE
O2/TOTAL GAS SETTING VFR VENT: 35 %
O2/TOTAL GAS SETTING VFR VENT: 55 %
O2/TOTAL GAS SETTING VFR VENT: ABNORMAL %
OXYHGB MFR BLD: 88 % (ref 92–100)
PCO2 BLD: 76 MM HG (ref 35–45)
PCO2 BLD: 78 MM HG (ref 35–45)
PCO2 BLD: 80 MM HG (ref 35–45)
PH BLD: 7.3 PH (ref 7.35–7.45)
PH BLD: 7.3 PH (ref 7.35–7.45)
PH BLD: 7.34 PH (ref 7.35–7.45)
PH UR STRIP: 5 PH (ref 5–7)
PLATELET # BLD AUTO: 158 10E9/L (ref 150–450)
PO2 BLD: 59 MM HG (ref 80–105)
PO2 BLD: 70 MM HG (ref 80–105)
PO2 BLD: 76 MM HG (ref 80–105)
POTASSIUM SERPL-SCNC: 4.4 MMOL/L (ref 3.4–5.3)
POTASSIUM SERPL-SCNC: 4.5 MMOL/L (ref 3.4–5.3)
PROCALCITONIN SERPL-MCNC: 0.15 NG/ML
RBC # BLD AUTO: 5.64 10E12/L (ref 3.8–5.2)
RBC #/AREA URNS AUTO: 0 /HPF (ref 0–2)
SODIUM SERPL-SCNC: 140 MMOL/L (ref 133–144)
SODIUM SERPL-SCNC: 141 MMOL/L (ref 133–144)
SOURCE: ABNORMAL
SP GR UR STRIP: 1.01 (ref 1–1.03)
SPECIMEN SOURCE: NORMAL
SQUAMOUS #/AREA URNS AUTO: 1 /HPF (ref 0–1)
T4 FREE SERPL-MCNC: 1.32 NG/DL (ref 0.76–1.46)
TRANS CELLS #/AREA URNS HPF: <1 /HPF (ref 0–1)
TSH SERPL DL<=0.005 MIU/L-ACNC: 6.92 MU/L (ref 0.4–4)
UROBILINOGEN UR STRIP-MCNC: NORMAL MG/DL (ref 0–2)
WBC # BLD AUTO: 10.4 10E9/L (ref 4–11)
WBC #/AREA URNS AUTO: 1 /HPF (ref 0–2)

## 2017-12-14 PROCEDURE — 40000275 ZZH STATISTIC RCP TIME EA 10 MIN

## 2017-12-14 PROCEDURE — 25000125 ZZHC RX 250: Performed by: EMERGENCY MEDICINE

## 2017-12-14 PROCEDURE — 87040 BLOOD CULTURE FOR BACTERIA: CPT | Performed by: STUDENT IN AN ORGANIZED HEALTH CARE EDUCATION/TRAINING PROGRAM

## 2017-12-14 PROCEDURE — 85027 COMPLETE CBC AUTOMATED: CPT | Performed by: INTERNAL MEDICINE

## 2017-12-14 PROCEDURE — 93005 ELECTROCARDIOGRAM TRACING: CPT

## 2017-12-14 PROCEDURE — 84443 ASSAY THYROID STIM HORMONE: CPT | Performed by: INTERNAL MEDICINE

## 2017-12-14 PROCEDURE — 25000132 ZZH RX MED GY IP 250 OP 250 PS 637: Performed by: INTERNAL MEDICINE

## 2017-12-14 PROCEDURE — 93325 DOPPLER ECHO COLOR FLOW MAPG: CPT | Mod: 26 | Performed by: INTERNAL MEDICINE

## 2017-12-14 PROCEDURE — 93308 TTE F-UP OR LMTD: CPT | Mod: 26 | Performed by: INTERNAL MEDICINE

## 2017-12-14 PROCEDURE — 36415 COLL VENOUS BLD VENIPUNCTURE: CPT | Performed by: STUDENT IN AN ORGANIZED HEALTH CARE EDUCATION/TRAINING PROGRAM

## 2017-12-14 PROCEDURE — 25000128 H RX IP 250 OP 636: Performed by: EMERGENCY MEDICINE

## 2017-12-14 PROCEDURE — 80048 BASIC METABOLIC PNL TOTAL CA: CPT | Performed by: INTERNAL MEDICINE

## 2017-12-14 PROCEDURE — 93321 DOPPLER ECHO F-UP/LMTD STD: CPT | Mod: 26 | Performed by: INTERNAL MEDICINE

## 2017-12-14 PROCEDURE — 94660 CPAP INITIATION&MGMT: CPT

## 2017-12-14 PROCEDURE — 25000132 ZZH RX MED GY IP 250 OP 250 PS 637: Performed by: STUDENT IN AN ORGANIZED HEALTH CARE EDUCATION/TRAINING PROGRAM

## 2017-12-14 PROCEDURE — 83605 ASSAY OF LACTIC ACID: CPT | Performed by: INTERNAL MEDICINE

## 2017-12-14 PROCEDURE — 87899 AGENT NOS ASSAY W/OPTIC: CPT | Performed by: STUDENT IN AN ORGANIZED HEALTH CARE EDUCATION/TRAINING PROGRAM

## 2017-12-14 PROCEDURE — 83735 ASSAY OF MAGNESIUM: CPT | Performed by: INTERNAL MEDICINE

## 2017-12-14 PROCEDURE — 25000128 H RX IP 250 OP 636: Performed by: STUDENT IN AN ORGANIZED HEALTH CARE EDUCATION/TRAINING PROGRAM

## 2017-12-14 PROCEDURE — 36600 WITHDRAWAL OF ARTERIAL BLOOD: CPT

## 2017-12-14 PROCEDURE — 87633 RESP VIRUS 12-25 TARGETS: CPT | Performed by: STUDENT IN AN ORGANIZED HEALTH CARE EDUCATION/TRAINING PROGRAM

## 2017-12-14 PROCEDURE — 81001 URINALYSIS AUTO W/SCOPE: CPT | Performed by: EMERGENCY MEDICINE

## 2017-12-14 PROCEDURE — 84145 PROCALCITONIN (PCT): CPT | Performed by: INTERNAL MEDICINE

## 2017-12-14 PROCEDURE — 25000125 ZZHC RX 250: Performed by: STUDENT IN AN ORGANIZED HEALTH CARE EDUCATION/TRAINING PROGRAM

## 2017-12-14 PROCEDURE — 84156 ASSAY OF PROTEIN URINE: CPT | Performed by: STUDENT IN AN ORGANIZED HEALTH CARE EDUCATION/TRAINING PROGRAM

## 2017-12-14 PROCEDURE — 93010 ELECTROCARDIOGRAM REPORT: CPT | Performed by: INTERNAL MEDICINE

## 2017-12-14 PROCEDURE — 40000264 ECHO LIMITED WITH OPTISON

## 2017-12-14 PROCEDURE — 96365 THER/PROPH/DIAG IV INF INIT: CPT | Performed by: EMERGENCY MEDICINE

## 2017-12-14 PROCEDURE — 96375 TX/PRO/DX INJ NEW DRUG ADDON: CPT | Performed by: EMERGENCY MEDICINE

## 2017-12-14 PROCEDURE — 94640 AIRWAY INHALATION TREATMENT: CPT | Mod: 76

## 2017-12-14 PROCEDURE — 94640 AIRWAY INHALATION TREATMENT: CPT

## 2017-12-14 PROCEDURE — 84439 ASSAY OF FREE THYROXINE: CPT | Performed by: INTERNAL MEDICINE

## 2017-12-14 PROCEDURE — 25000128 H RX IP 250 OP 636: Performed by: INTERNAL MEDICINE

## 2017-12-14 PROCEDURE — 36415 COLL VENOUS BLD VENIPUNCTURE: CPT | Performed by: INTERNAL MEDICINE

## 2017-12-14 PROCEDURE — 27210995 ZZH RX 272: Performed by: STUDENT IN AN ORGANIZED HEALTH CARE EDUCATION/TRAINING PROGRAM

## 2017-12-14 PROCEDURE — 82805 BLOOD GASES W/O2 SATURATION: CPT | Performed by: INTERNAL MEDICINE

## 2017-12-14 PROCEDURE — 99223 1ST HOSP IP/OBS HIGH 75: CPT | Mod: 25 | Performed by: INTERNAL MEDICINE

## 2017-12-14 PROCEDURE — 82803 BLOOD GASES ANY COMBINATION: CPT | Performed by: INTERNAL MEDICINE

## 2017-12-14 PROCEDURE — 25500064 ZZH RX 255 OP 636: Performed by: INTERNAL MEDICINE

## 2017-12-14 PROCEDURE — 96376 TX/PRO/DX INJ SAME DRUG ADON: CPT | Performed by: EMERGENCY MEDICINE

## 2017-12-14 PROCEDURE — 40000281 ZZH STATISTIC TRANSPORT TIME EA 15 MIN

## 2017-12-14 PROCEDURE — 82803 BLOOD GASES ANY COMBINATION: CPT | Performed by: STUDENT IN AN ORGANIZED HEALTH CARE EDUCATION/TRAINING PROGRAM

## 2017-12-14 PROCEDURE — 12000006 ZZH R&B IMCU INTERMEDIATE UMMC

## 2017-12-14 PROCEDURE — 40000141 ZZH STATISTIC PERIPHERAL IV START W/O US GUIDANCE

## 2017-12-14 PROCEDURE — 84156 ASSAY OF PROTEIN URINE: CPT | Performed by: EMERGENCY MEDICINE

## 2017-12-14 PROCEDURE — 85610 PROTHROMBIN TIME: CPT | Performed by: INTERNAL MEDICINE

## 2017-12-14 RX ORDER — DILTIAZEM HYDROCHLORIDE 5 MG/ML
10 INJECTION INTRAVENOUS ONCE
Status: COMPLETED | OUTPATIENT
Start: 2017-12-14 | End: 2017-12-14

## 2017-12-14 RX ORDER — FUROSEMIDE 10 MG/ML
40 INJECTION INTRAMUSCULAR; INTRAVENOUS ONCE
Status: COMPLETED | OUTPATIENT
Start: 2017-12-14 | End: 2017-12-14

## 2017-12-14 RX ORDER — METOPROLOL TARTRATE 50 MG
50 TABLET ORAL 2 TIMES DAILY
Status: DISCONTINUED | OUTPATIENT
Start: 2017-12-14 | End: 2017-12-20 | Stop reason: HOSPADM

## 2017-12-14 RX ORDER — ACETAZOLAMIDE 500 MG/5ML
250 INJECTION, POWDER, LYOPHILIZED, FOR SOLUTION INTRAVENOUS ONCE
Status: COMPLETED | OUTPATIENT
Start: 2017-12-14 | End: 2017-12-14

## 2017-12-14 RX ORDER — WARFARIN SODIUM 5 MG/1
5 TABLET ORAL
Status: DISCONTINUED | OUTPATIENT
Start: 2017-12-14 | End: 2017-12-14

## 2017-12-14 RX ORDER — ACETAMINOPHEN 325 MG/1
650 TABLET ORAL EVERY 4 HOURS PRN
Status: DISCONTINUED | OUTPATIENT
Start: 2017-12-14 | End: 2017-12-20 | Stop reason: HOSPADM

## 2017-12-14 RX ORDER — DABIGATRAN ETEXILATE 150 MG/1
150 CAPSULE ORAL 2 TIMES DAILY
Status: DISCONTINUED | OUTPATIENT
Start: 2017-12-14 | End: 2017-12-14

## 2017-12-14 RX ORDER — FUROSEMIDE 10 MG/ML
40 INJECTION INTRAMUSCULAR; INTRAVENOUS
Status: DISCONTINUED | OUTPATIENT
Start: 2017-12-14 | End: 2017-12-15

## 2017-12-14 RX ORDER — ACETAMINOPHEN 650 MG/1
650 SUPPOSITORY RECTAL EVERY 4 HOURS PRN
Status: DISCONTINUED | OUTPATIENT
Start: 2017-12-14 | End: 2017-12-20 | Stop reason: HOSPADM

## 2017-12-14 RX ORDER — LIDOCAINE 40 MG/G
CREAM TOPICAL
Status: DISCONTINUED | OUTPATIENT
Start: 2017-12-14 | End: 2017-12-20 | Stop reason: HOSPADM

## 2017-12-14 RX ORDER — LEVALBUTEROL INHALATION SOLUTION 1.25 MG/3ML
1.25 SOLUTION RESPIRATORY (INHALATION)
Status: DISCONTINUED | OUTPATIENT
Start: 2017-12-14 | End: 2017-12-16

## 2017-12-14 RX ORDER — WARFARIN SODIUM 7.5 MG/1
7.5 TABLET ORAL
Status: COMPLETED | OUTPATIENT
Start: 2017-12-14 | End: 2017-12-14

## 2017-12-14 RX ADMIN — HUMAN ALBUMIN MICROSPHERES AND PERFLUTREN 4 ML: 10; .22 INJECTION, SOLUTION INTRAVENOUS at 11:15

## 2017-12-14 RX ADMIN — WARFARIN SODIUM 7.5 MG: 7.5 TABLET ORAL at 18:46

## 2017-12-14 RX ADMIN — IPRATROPIUM BROMIDE 0.5 MG: 0.5 SOLUTION RESPIRATORY (INHALATION) at 20:20

## 2017-12-14 RX ADMIN — LEVALBUTEROL HYDROCHLORIDE 1.25 MG: 1.25 SOLUTION RESPIRATORY (INHALATION) at 16:00

## 2017-12-14 RX ADMIN — IOPAMIDOL 100 ML: 755 INJECTION, SOLUTION INTRAVENOUS at 00:01

## 2017-12-14 RX ADMIN — METOPROLOL TARTRATE 50 MG: 50 TABLET, FILM COATED ORAL at 18:46

## 2017-12-14 RX ADMIN — SODIUM CHLORIDE 90 ML: 9 INJECTION, SOLUTION INTRAVENOUS at 00:02

## 2017-12-14 RX ADMIN — CEFTRIAXONE 1 G: 10 INJECTION, POWDER, FOR SOLUTION INTRAVENOUS at 17:15

## 2017-12-14 RX ADMIN — DILTIAZEM HYDROCHLORIDE 5 MG/HR: 5 INJECTION INTRAVENOUS at 02:54

## 2017-12-14 RX ADMIN — ENOXAPARIN SODIUM 40 MG: 40 INJECTION SUBCUTANEOUS at 09:21

## 2017-12-14 RX ADMIN — FUROSEMIDE 40 MG: 10 INJECTION, SOLUTION INTRAVENOUS at 10:24

## 2017-12-14 RX ADMIN — DILTIAZEM HYDROCHLORIDE 10 MG: 5 INJECTION INTRAVENOUS at 01:16

## 2017-12-14 RX ADMIN — FUROSEMIDE 40 MG: 10 INJECTION, SOLUTION INTRAMUSCULAR; INTRAVENOUS at 02:04

## 2017-12-14 RX ADMIN — FUROSEMIDE 40 MG: 10 INJECTION, SOLUTION INTRAVENOUS at 16:09

## 2017-12-14 RX ADMIN — FUROSEMIDE 40 MG: 10 INJECTION, SOLUTION INTRAVENOUS at 04:44

## 2017-12-14 RX ADMIN — ALBUTEROL SULFATE 2.5 MG: 2.5 SOLUTION RESPIRATORY (INHALATION) at 00:23

## 2017-12-14 RX ADMIN — AZITHROMYCIN MONOHYDRATE 500 MG: 500 INJECTION, POWDER, LYOPHILIZED, FOR SOLUTION INTRAVENOUS at 16:22

## 2017-12-14 RX ADMIN — ACETAMINOPHEN 650 MG: 325 TABLET, FILM COATED ORAL at 04:39

## 2017-12-14 RX ADMIN — IPRATROPIUM BROMIDE 0.5 MG: 0.5 SOLUTION RESPIRATORY (INHALATION) at 16:00

## 2017-12-14 RX ADMIN — ACETAZOLAMIDE 250 MG: 500 INJECTION, POWDER, LYOPHILIZED, FOR SOLUTION INTRAVENOUS at 18:46

## 2017-12-14 RX ADMIN — LEVALBUTEROL HYDROCHLORIDE 1.25 MG: 1.25 SOLUTION RESPIRATORY (INHALATION) at 20:20

## 2017-12-14 RX ADMIN — METOPROLOL TARTRATE 50 MG: 50 TABLET, FILM COATED ORAL at 10:59

## 2017-12-14 ASSESSMENT — ACTIVITIES OF DAILY LIVING (ADL)
ADLS_ACUITY_SCORE: 12
ADLS_ACUITY_SCORE: 12

## 2017-12-14 NOTE — PROGRESS NOTES
O2 managed through Bipap use. Resps shallow and SOB with activity. Up with SBA/1 assist. Voiding large amounts with IV lasix. Alert, oriented. Reports being extremely fatigued. Urine sample collected: UA negative; ongoing 24 hour urine collection for protein urine Creat ratio. +4 edema in abdominal area. Denies pain. Some skin breakdown noted in panis.     At 1100, RR was called due to patient ABG results (CO2: 78, see lab results) and continued use of Oxymask at 10L/min. Patient appeared fatigued. BP remained stable. Provider, RT and ICU RN at bedside. Bipap restarted at 40%, O2 sats improved to 95%. Plan to continue on BIPAP and diurese as tolerated. Metoprolol given prior to d/c Diltiazem gtt (titrated per MD recommendation).   Transfer  Transferred to: 6B  Via: bed  Reason for transfer: Pt inappropriate for 6C- worsening patient condition  Family: Aware of transfer  Belongings: Sent with pt  Chart: Sent with pt  Medications: Meds from bin sent with pt  Report called to: Renuka RODRIGUEZ

## 2017-12-14 NOTE — CONSULTS
[facility]    Pulmonary, Allergy, Critical Care and Sleep Medicine    Pulmonary Consult      Courtney Anderson MRN# [id number]   Age: 47 year old YOB: 1970     Date of Admission:  12/13/2017  Reason for Consultation: Hypercapnic/ hypoxemic respiratory failure  Requesting Physician: Yesi Jon  Primary care provider: No Ref-Primary, Physician     Assessment and Plan:  48 yo woman with morbid obesity estimated BMI about 68 who is admitted on 12/13 due to 2-3 weeks worsening of SOB on minimal exertion with peripheral edema and found to have new onset A-fib with RVR.   .  # Obesity hypoventilation syndrome, # suspected HILLARY  # Hypoxemic and hypercapnic respiratory failure, acute on chronic  # CHF, New onset Atrial fibrillation with RVR  This patient meets the criteria of OHS. ABG at 2pm 12/14 today pH7.30/PCO2 80/PO2 76/HCO3-40 is consistent with mostly chronic process mixed with contraction alkalosis due to diuretics. ABG is slightly improved after BIPAP use to pH7.34/ 76/59/41.   Rate controlled after diltiazem drip. On metoprolol 50 mg po BID, started to be on Coumadin.   On Lasix 40mg Q 12 hours currently. Patient is obtunded, but arousable. Follow command.    Since the patients  needs to continue diuresis still, giving acetazolamide with daily monitoring of HCO3- on BMP is recommended at this point. She is clinically stable for now. Will continue watchful monitoring her mental status. Check ABG if the patient metal status worsens.     -c/w BIPAP with current setting   -consider using acetazolamide 250mg IV Q 12hours, aiming for HCO3- <35.   -daily ABG check in AM, +as needed when the patient metal status changes.   -make sure to have sleep medicine follow up for sleep study.     I discussed and saw this case with Dr. Mireles.    Maria Del Rosario Wylie MD  Pulmonary Critical Care Fellow  [phone]           Chief Complaint:     SOB, edema.      History of Present Illness:                  46 yo woman with morbid obesity estimated BMI about 68 who is admitted on 12/13 due to 2-3 weeks worsening of SOB on minimal exertion with peripheral edema and found to have new onset A-fib with RVR.   On admission, the patient reported that she has been getting more short of breath x 3 weeks.  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 She also has some increased LE edema.  She notes some mild cough but denies any fevers or chills.  Negative for any acute weight gain patient report. She also recognized that her abdomen was tense and full. Denied any appetite loss. No medications on a regular basis. Denied any similar episode of admission in the past.          Past Medical History:   History reviewed. No pertinent past medical history.         Past Surgical History:   History reviewed. No pertinent surgical history.         Social History:     Social History     Social History     Marital status: Single     Spouse name: N/A     Number of children: N/A     Years of education: N/A     Occupational History     Not on file.     Social History Main Topics     Smoking status: Former Smoker     Smokeless tobacco: Never Used     Alcohol use No     Drug use: No     Sexual activity: Not on file     Other Topics Concern     Not on file     Social History Narrative     No narrative on file              Family History:   No significant lung disease in family          Allergies:   Please see allergy list which was reviewed this admission.         Medications:       sodium chloride (PF)  3 mL Intracatheter Q8H     furosemide  40 mg Intravenous BID     metoprolol  50 mg Oral BID     warfarin  7.5 mg Oral ONCE at 18:00     levalbuterol  1.25 mg Nebulization 4x daily     ipratropium  0.5 mg Nebulization 4x daily     cefTRIAXone  1 g Intravenous Q24H     azithromycin  500 mg Intravenous Q24H     lidocaine (buffered or not buffered), lidocaine 4%,  sodium chloride (PF), - MEDICATION INSTRUCTIONS -, acetaminophen, acetaminophen, - MEDICATION INSTRUCTIONS -, hypromellose-dextran, - MEDICATION INSTRUCTIONS -, Warfarin Therapy Reminder         Review of Systems:   CONSTITUTIONAL: negative for fever, chills, change in weight  INTEGUMENTARY/SKIN: no rash or obvious new lesions  ENT/MOUTH: no sore throat, new sinus pain or nasal drainage  RESP: see interval history  CV: negative for chest pain, palpitations or peripheral edema  GI: no nausea, vomiting, change in stools  : no dysuria  MUSCULOSKELETAL: no myalgias, arthralgias  ENDOCRINE: blood sugars with adequate control  PSYCHIATRIC: mood stable  LYMPHATIC: no new lymphadenopathy  HEME: no bleeding or easy bruisability  NEURO: no numbness, weakness, headaches         Physical Exam:   Temp:  [96.5  F (35.8  C)-98.9  F (37.2  C)] 97.6  F (36.4  C)  Pulse:  [] 138  Heart Rate:  [] 88  Resp:  [20-58] 20  BP: (100-143)/() 116/74  FiO2 (%):  [35 %-55 %] 35 %  SpO2:  [78 %-100 %] 96 %    Intake/Output Summary (Last 24 hours) at 12/14/17 1716  Last data filed at 12/14/17 1449   Gross per 24 hour   Intake           473.17 ml   Output             3800 ml   Net         -3326.83 ml       Constitutional:   Arousable but obtuded, follow command.    Eyes:   Nonicteric, BRYAN   ENT:    oral mucosa moist without lesions   Neck:   Supple without supraclavicular or cervical lymphadenopathy   Lungs:   Good air flow.  No crackles. No rhonchi.  No wheezes.   Cardiovascular:   Normal S1 and S2.  RRR.  No murmur, gallop or rub.  Radial, DP and PT pulses normal and symmetric   Abdomen:   NABS, soft, nontender, nondistended.  No HSM.   Musculoskeletal:   2+ pitting edema BL legs. Strength 5/5 and symmetric   Neurologic:   Alert and conversant. Cranial nerves II-XII intact.     Skin:   Warm, dry.  No rash on limited exam.           Data:   All laboratory and imaging data reviewed.    CMP  Recent Labs  Lab 12/14/17  1115  12/14/17 0459 12/13/17 2247 12/13/17  2241    140 142 144   POTASSIUM 4.5 4.4 4.3 4.4   CHLORIDE 98 100  --  104   CO2 40* 36*  --  34*   ANIONGAP 3 5  --  6   * 117* 93 92   BUN 14 14  --  14   CR 0.87 0.80  --  0.72   GFRESTIMATED 69 76  --  86   GFRESTBLACK 83 >90  --  >90   RUT 8.3* 8.2*  --  8.1*   MAG 2.0 2.1  --   --    PROTTOTAL  --   --   --  8.0   ALBUMIN  --   --   --  2.9*   BILITOTAL  --   --   --  1.2   ALKPHOS  --   --   --  105   AST  --   --   --  21   ALT  --   --   --  21     CBC  Recent Labs  Lab 12/14/17 0459 12/13/17 2247 12/13/17  2241   WBC 10.4  --  10.3   RBC 5.64*  --  5.79*   HGB 12.6 16.7* 13.3   HCT 48.1*  --  48.1*   MCV 85  --  83   MCH 22.3*  --  23.0*   MCHC 26.2*  --  27.7*   RDW 19.4*  --  18.6*     --  179     INR  Recent Labs  Lab 12/14/17 0459 12/13/17  2241   INR 1.20* 1.17*     Arterial Blood Gas  Recent Labs  Lab 12/14/17  1605 12/14/17  1345 12/14/17  1020   PH 7.34* 7.30* 7.30*   PCO2 76* 80* 78*   PO2 59* 76* 70*   HCO3 41* 40* 38*   O2PER 35 55 10 L     Urine Studies  Recent Labs   Lab Test  12/14/17   1107   URINEPH  5.0   NITRITE  Negative   LEUKEST  Negative   WBCU  1     CMV viral loads  No lab results found.  No results found for: CMQNT, CMVQ  EBV viral loads   No lab results found.  No results found for: EBVDN, EBRES, EBVDN, EBVSP, EBVPC, EBVPCR  Respiratory Virus Testing    No results found for: RS, FLUAG   Sputum Cultures in the last 3 months:  No results found for: SDES No results found for: CULT

## 2017-12-14 NOTE — ED NOTES
ED to Floor Handoff      S:  Courtney Anderson is a 47 year old female who speaks English and lives with family members,  in a home  They arrived in the ED by car from home with a complaint of Shortness of Breath (Has been feeling short of breath for 1 1/2 weeks.  Feels her stomach is bloated.  Denies leg swelling.  )    Initial vitals were:   BP: 123/86  Pulse: 86  Heart Rate: 130  Temp: 97.3  F (36.3  C)  Resp: 26  Weight: (!) 190.2 kg (419 lb 4.8 oz)  SpO2: (!) 78 %  Allergies: No Known Allergies.  The meds given in the ED and their home medications are:   Current Facility-Administered Medications   Medication     diltiazem (CARDIZEM) 125 mg in NaCl 0.9 % 125 mL infusion     albuterol neb solution 2.5 mg     No current outpatient prescriptions on file.     Social demographics are   Social History     Social History     Marital status: Single     Spouse name: N/A     Number of children: N/A     Years of education: N/A     Social History Main Topics     Smoking status: Former Smoker     Smokeless tobacco: Never Used     Alcohol use No     Drug use: No     Sexual activity: Not Asked     Other Topics Concern     None     Social History Narrative     None       B:   The patient has been ill for 3 week(s) and during this time the symptoms have increased.  In the ED was diagnosed with   Final diagnoses:   SOB (shortness of breath)   Hypoxia   New onset a-fib (H)   Hypercarbia    Infection/sepsis suspected:No Isolation type; No active isolations   A:   In the ED these meds were given:   Medications   albuterol neb solution 2.5 mg (2.5 mg Nebulization Given 12/14/17 0023)   diltiazem (CARDIZEM) 125 mg in NaCl 0.9 % 125 mL infusion (not administered)   ipratropium - albuterol 0.5 mg/2.5 mg/3 mL (DUONEB) neb solution 3 mL (3 mLs Nebulization Given 12/13/17 2249)   0.9% sodium chloride BOLUS (500 mLs Intravenous New Bag 12/13/17 8932)   diltiazem (CARDIZEM) injection 10 mg (10 mg Intravenous Given 12/13/17 7427)   sodium  chloride 0.9 % bag 500mL for CT scan flush use (90 mLs As instructed Given 12/14/17 0002)   iopamidol (ISOVUE-370) solution 100 mL (100 mLs Intravenous Given 12/14/17 0001)   diltiazem (CARDIZEM) injection 10 mg (10 mg Intravenous Given 12/14/17 0116)   furosemide (LASIX) injection 40 mg (40 mg Intravenous Given 12/14/17 0204)     Drips running?  Yes  Labs results   Labs Ordered and Resulted from Time of ED Arrival Up to the Time of Departure from the ED   CBC WITH PLATELETS DIFFERENTIAL - Abnormal; Notable for the following:        Result Value    RBC Count 5.79 (*)     Hematocrit 48.1 (*)     MCH 23.0 (*)     MCHC 27.7 (*)     RDW 18.6 (*)     Nucleated RBCs 2 (*)     All other components within normal limits   COMPREHENSIVE METABOLIC PANEL - Abnormal; Notable for the following:     Carbon Dioxide 34 (*)     Calcium 8.1 (*)     Albumin 2.9 (*)     All other components within normal limits   INR - Abnormal; Notable for the following:     INR 1.17 (*)     All other components within normal limits   D DIMER QUANTITATIVE - Abnormal; Notable for the following:     D Dimer 2.9 (*)     All other components within normal limits   NT PROBNP INPATIENT - Abnormal; Notable for the following:     N-Terminal Pro BNP Inpatient 1908 (*)     All other components within normal limits   ISTAT GASES ELEC ICA GLUC JEWELL POCT - Abnormal; Notable for the following:     PCO2 Venous 66 (*)     Bicarbonate Venous 35 (*)     Hemoglobin 16.7 (*)     Hematocrit - POCT 49 (*)     All other components within normal limits   GLUCOSE BY METER   ROUTINE UA WITH MICROSCOPIC   CARDIAC CONTINUOUS MONITORING   ISTAT CG8 GAS ELEC ICA GLUC JEWELL NURSE POCT   STRICT INTAKE AND OUTPUT   TROPONIN POCT   INFLUENZA A/B ANTIGEN     Imaging Studies:   Recent Results (from the past 24 hour(s))   XR Chest Port 1 View    Narrative    CHEST PORTABLE ONE VIEW  12/13/2017 10:53 PM      HISTORY: Shortness of breath.     COMPARISON: None.    FINDINGS: Upright portable  chest. The heart is enlarged without  pulmonary edema. Allowing for body habitus, the lungs are clear. No  pneumothorax.      Impression    IMPRESSION: Cardiomegaly. No acute abnormality.    GLORY JONES MD   CT Chest Pulmonary Embolism w Contrast    Narrative    CT CHEST PULMONARY EMBOLISM W CONTRAST  12/14/2017 12:02 AM     HISTORY: Shortness of breath. Evaluate for pulmonary embolus.    TECHNIQUE: Volumetric acquisition of the chest after the  administration of 77 mL Isovue-370 IV contrast. Radiation dose for  this scan was reduced using automated exposure control, adjustment of  the mA and/or kV according to patient size, or iterative  reconstruction technique.     COMPARISON: None.    FINDINGS: No visualized pulmonary embolism. Suboptimal visualization  and enhancement of pulmonary arteries, particularly the smaller  peripheral pulmonary arteries, relating to body habitus. No large  central pulmonary emboli. Minimal linear atelectasis or scarring in  the lingula and lower lungs. No consolidation, pleural effusions or  pneumothorax. Heart size near upper limits of normal. No enlarged  mediastinal or hilar lymph nodes. Incidentally noted is a small amount  of fluid adjacent to the dome and lateral aspect of the liver.  Nonspecific subcutaneous edema. 2.3 cm right adrenal nodule.  Degenerative disc disease in the lower thoracic and lumbar spine.      Impression    IMPRESSION:  1. No visualized pulmonary embolism. Examination is limited by  suboptimal opacification and visualization of peripheral pulmonary  arteries.  2. No other acute findings in the chest.  3. Small amount of nonspecific fluid adjacent to the liver.  Nonspecific subcutaneous edema.  4. Small right adrenal nodule.    CEDRIC WICK MD     Recent vital signs /63  Pulse 86  Temp 97.3  F (36.3  C) (Oral)  Resp (!) 51  Wt (!) 190.2 kg (419 lb 4.8 oz)  SpO2 99%  Cardiac Rhythm: ,      Abnormal labs/tests/findings requiring  intervention:---  Pain control: pt had none  Nausea control: pt had none  R:   Transfer assistance needed: Independent  Family present during ED course? Yes   Family currently present? Yes  Pt needs tele? Yes  Code Status: Full Code  Tasks needing to be completed:---    [place]-- [phone] [facility]  [phone] [facility]

## 2017-12-14 NOTE — PROGRESS NOTES
"  Cardiology  Progress Note    Courtney Anderson ([id number]) admitted on 12/13/2017 12/14/2017    Subjective   No acute event overnight. Mentating well in the am but deteriorate rapidly. Breathing tenuously when moving with activities. Transfer to  due to accumulating Co2. Family aware    4 pt ROS negative except above    Objective   Most recent vital signs:  /74 (BP Location: Left arm)  Pulse 138  Temp 97.6  F (36.4  C) (Axillary)  Resp 20  Ht 1.626 m (5' 4\")  Wt (!) 186.8 kg (411 lb 12.8 oz)  SpO2 96%  BMI 70.69 kg/m2  Temp:  [96.5  F (35.8  C)-98.9  F (37.2  C)] 97.6  F (36.4  C)  Pulse:  [] 138  Heart Rate:  [] 88  Resp:  [20-58] 20  BP: (100-143)/() 116/74  FiO2 (%):  [35 %-55 %] 35 %  SpO2:  [78 %-100 %] 96 %  Wt Readings from Last 2 Encounters:   12/14/17 (!) 186.8 kg (411 lb 12.8 oz)       Intake/Output Summary (Last 24 hours) at 12/14/17 1737  Last data filed at 12/14/17 1700   Gross per 24 hour   Intake           473.17 ml   Output             4200 ml   Net         -3726.83 ml       Physical exam:  General: AAOx3, no clubbing/cyanosis, 1+ edema, Can't appreciate JV  HEENT:  Supple   Cardiac: RRR. No m/r/g. Normal S1, S2.   Pulm: unable to assess for lung sounds  Abd: +BS, soft, non distended, non tender  Skin: No new rash/petechiae  MSK: No new deformities  Neuro:  No new focal deficits    Us bedside, IJ up to midneck on sitting position    Labs (Past three days):  Reviewed and remarkable for: reviewed    Imaging/procedure results:   Reviewed and remarkable for:   Recent Results (from the past 48 hour(s))   XR Chest Port 1 View    Narrative    CHEST PORTABLE ONE VIEW  12/13/2017 10:53 PM      HISTORY: Shortness of breath.     COMPARISON: None.    FINDINGS: Upright portable chest. The heart is enlarged without  pulmonary edema. Allowing for body habitus, the lungs are clear. No  pneumothorax.      Impression    IMPRESSION: Cardiomegaly. No acute abnormality.    GLORY JONES, " MD   CT Chest Pulmonary Embolism w Contrast    Narrative    CT CHEST PULMONARY EMBOLISM W CONTRAST  12/14/2017 12:02 AM     HISTORY: Shortness of breath. Evaluate for pulmonary embolus.    TECHNIQUE: Volumetric acquisition of the chest after the  administration of 77 mL Isovue-370 IV contrast. Radiation dose for  this scan was reduced using automated exposure control, adjustment of  the mA and/or kV according to patient size, or iterative  reconstruction technique.     COMPARISON: None.    FINDINGS: No visualized pulmonary embolism. Suboptimal visualization  and enhancement of pulmonary arteries, particularly the smaller  peripheral pulmonary arteries, relating to body habitus. No large  central pulmonary emboli. Minimal linear atelectasis or scarring in  the lingula and lower lungs. No consolidation, pleural effusions or  pneumothorax. Heart size near upper limits of normal. No enlarged  mediastinal or hilar lymph nodes. Incidentally noted is a small amount  of fluid adjacent to the dome and lateral aspect of the liver.  Nonspecific subcutaneous edema. 2.3 cm right adrenal nodule.  Degenerative disc disease in the lower thoracic and lumbar spine.      Impression    IMPRESSION:  1. No visualized pulmonary embolism. Examination is limited by  suboptimal opacification and visualization of peripheral pulmonary  arteries.  2. No other acute findings in the chest.  3. Small amount of nonspecific fluid adjacent to the liver.  Nonspecific subcutaneous edema.  4. Small right adrenal nodule.    CEDRIC WICK MD       Assessment & Plan   Courtney Anderson is a 47 year old female who presents with no significant past medical history who comes infor 2-3 weeks of worsening shortness of breath with minimal exertion, acutely worse over the past 48 hours.     # Acute on chronic hypoxemic and hypercarbic RS failure  # New diagnosis of clinical CHF, unclear EF  # Obesity hypoventilation syndrome  Patient presented with signs and  symptoms of volume overload and SOB. Very difficult to assess fluid status and lung by physical exam. BNP elevated 1600, CT without PE. Interestingly with out much pulmonary alveolar infiltrate suggesting that there are other component other CHF contributing to RS failure. Patient's RS status deteriorate despite UOP ~ 3.8L and being on BiPAP; therefore empirically covered for atypical pneumonia  - lasix 40 mg iv bid  - BiPAP, pulm consult appreciate recs  - Septic workup, empirical coverage for CAP   procal neg   B/C, sputum, Urine for legionella, UA  - diamox 250 mg IV once to cover for possible overshoot metabolic alkalosis from high UOP which could further worsened respiratory acidosis. Goal HCO3 around 35  - will need sleep medicine f/u  - ABG prn mental status changes, in am    # Afib with RVR   Unclear onset. Likely as a response to volume overload syndrome and pulmonary htn. CHADVASC = 1 (female). Given planning to attempt cardioversion in the future, elected to anticoagulate  - pharmacy to dose warfarin  - diltiazem gtt -> bridge to metoprolol 50 mg bid for rate control    # Morbid obesity  Requiring extra medical care and appliances    FEN: NPO apart from med/ice chip  DVT Prophylaxis:  warfarin  GI Prophylaxis: not indicated  Disposition: pending  Code Status: FULL    Seen and discussed with Dr. Paige who agrees with above assessment and plan.  Meghan Jon MD PGY2 Internal Medicine

## 2017-12-14 NOTE — ED PROVIDER NOTES
History     Chief Complaint   Patient presents with     Shortness of Breath     Has been feeling short of breath for 1 1/2 weeks.  Feels her stomach is bloated.  Denies leg swelling.       HPI  Courtney Anderson is a 47 year old female who presents to the ER due to shortness of breath. Patient says that she has been getting more short of breath for the past 3 weeks.  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  Patient says that she is unable to walk around due to shortness of breath. She notes some mild cough but denies any fevers or chills.  She denies any chest pain. She denies any palpitations. She says that she hasn t noticed any weight gain. She s says she has been feeling tense and full in her abdomen. She s been eating and drinking normally. Patient states that she previously use to smoke a couple cigarettes here and there but denies any smoking in the last 3 weeks. Patient says that she does not take any medications on a regular basis. She has not been admitted to the hospital for some similar symptoms in the past. Patient is accompanied here by her daughter and her granddaughter.    This part of the document was transcribed by Britt Mcmillan Medical Scribe.     I have reviewed the Medications, Allergies, Past Medical and Surgical History, and Social History in the Epic system.    History reviewed. No pertinent past medical history.    History reviewed. No pertinent surgical history.    No family history on file.    Social History   Substance Use Topics     Smoking status: Former Smoker     Smokeless tobacco: Never Used     Alcohol use No       Current Facility-Administered Medications   Medication     albuterol neb solution 2.5 mg     sodium chloride 0.9 % bag 500mL for CT scan flush use     iopamidol (ISOVUE-370) solution 100 mL     No current outpatient prescriptions on file.        No Known Allergies     Review of Systems    Constitutional: Negative for chills and fever.   Respiratory: Positive for cough and shortness of breath.    Cardiovascular: Negative for chest pain and palpitations.   Gastrointestinal: Positive for abdominal distention.   All other systems reviewed and are negative.      Physical Exam   BP: 123/86  Pulse: 86  Heart Rate: 130  Temp: 97.3  F (36.3  C)  Resp: 26  Weight: (!) 190.2 kg (419 lb 4.8 oz)  SpO2: (!) 78 %      Physical Exam   Constitutional: She is oriented to person, place, and time. She appears well-developed and well-nourished.   Morbid obesity   HENT:   Head: Normocephalic and atraumatic.   Neck: Normal range of motion. Neck supple.   Cardiovascular: Normal heart sounds.  An irregularly irregular rhythm present. Tachycardia present.    Pulmonary/Chest: Effort normal and breath sounds normal. No respiratory distress.   Hypoxic to 70's on RA; improved to mid 90's% on 6 liters; no wheezing; no air hunger   Abdominal: Soft. She exhibits no distension. There is no tenderness. There is no rebound.   Large abdomen; nontender to touch   Musculoskeletal: She exhibits edema (1+ nonpitting edema). She exhibits no tenderness.   Neurological: She is alert and oriented to person, place, and time.   Skin: Skin is warm and dry.   Psychiatric: She has a normal mood and affect. Her behavior is normal. Thought content normal.       ED Course     ED Course     Procedures             EKG Interpretation:      Interpreted by Tressa Ricketts  Time reviewed: 2253  Symptoms at time of EKG: SOB   Rhythm: Afib  Rate: 130's  Axis: normal  Ectopy: none  Conduction: normal  ST Segments/ T Waves: No ST-T wave changes  Q Waves: none  Comparison to prior: new onset Afib    Clinical Impression: abnormal EKG            Critical Care time:  none         Results for orders placed or performed during the hospital encounter of 12/13/17   XR Chest Port 1 View    Narrative    CHEST PORTABLE ONE VIEW  12/13/2017 10:53 PM      HISTORY:  Shortness of breath.     COMPARISON: None.    FINDINGS: Upright portable chest. The heart is enlarged without  pulmonary edema. Allowing for body habitus, the lungs are clear. No  pneumothorax.      Impression    IMPRESSION: Cardiomegaly. No acute abnormality.    GLORY JONES MD   CT Chest Pulmonary Embolism w Contrast    Narrative    CT CHEST PULMONARY EMBOLISM W CONTRAST  12/14/2017 12:02 AM     HISTORY: Shortness of breath. Evaluate for pulmonary embolus.    TECHNIQUE: Volumetric acquisition of the chest after the  administration of 77 mL Isovue-370 IV contrast. Radiation dose for  this scan was reduced using automated exposure control, adjustment of  the mA and/or kV according to patient size, or iterative  reconstruction technique.     COMPARISON: None.    FINDINGS: No visualized pulmonary embolism. Suboptimal visualization  and enhancement of pulmonary arteries, particularly the smaller  peripheral pulmonary arteries, relating to body habitus. No large  central pulmonary emboli. Minimal linear atelectasis or scarring in  the lingula and lower lungs. No consolidation, pleural effusions or  pneumothorax. Heart size near upper limits of normal. No enlarged  mediastinal or hilar lymph nodes. Incidentally noted is a small amount  of fluid adjacent to the dome and lateral aspect of the liver.  Nonspecific subcutaneous edema. 2.3 cm right adrenal nodule.  Degenerative disc disease in the lower thoracic and lumbar spine.      Impression    IMPRESSION:  1. No visualized pulmonary embolism. Examination is limited by  suboptimal opacification and visualization of peripheral pulmonary  arteries.  2. No other acute findings in the chest.  3. Small amount of nonspecific fluid adjacent to the liver.  Nonspecific subcutaneous edema.  4. Small right adrenal nodule.    CEDRIC WICK MD   CBC with platelets differential   Result Value Ref Range    WBC 10.3 4.0 - 11.0 10e9/L    RBC Count 5.79 (H) 3.8 - 5.2 10e12/L     Hemoglobin 13.3 11.7 - 15.7 g/dL    Hematocrit 48.1 (H) 35.0 - 47.0 %    MCV 83 78 - 100 fl    MCH 23.0 (L) 26.5 - 33.0 pg    MCHC 27.7 (L) 31.5 - 36.5 g/dL    RDW 18.6 (H) 10.0 - 15.0 %    Platelet Count 179 150 - 450 10e9/L    Diff Method Automated Method     % Neutrophils 76.7 %    % Lymphocytes 15.0 %    % Monocytes 5.9 %    % Eosinophils 1.6 %    % Basophils 0.4 %    % Immature Granulocytes 0.4 %    Nucleated RBCs 2 (H) 0 /100    Absolute Neutrophil 7.9 1.6 - 8.3 10e9/L    Absolute Lymphocytes 1.5 0.8 - 5.3 10e9/L    Absolute Monocytes 0.6 0.0 - 1.3 10e9/L    Absolute Eosinophils 0.2 0.0 - 0.7 10e9/L    Absolute Basophils 0.0 0.0 - 0.2 10e9/L    Abs Immature Granulocytes 0.0 0 - 0.4 10e9/L    Absolute Nucleated RBC 0.2    Comprehensive metabolic panel   Result Value Ref Range    Sodium 144 133 - 144 mmol/L    Potassium 4.4 3.4 - 5.3 mmol/L    Chloride 104 94 - 109 mmol/L    Carbon Dioxide 34 (H) 20 - 32 mmol/L    Anion Gap 6 3 - 14 mmol/L    Glucose 92 70 - 99 mg/dL    Urea Nitrogen 14 7 - 30 mg/dL    Creatinine 0.72 0.52 - 1.04 mg/dL    GFR Estimate 86 >60 mL/min/1.7m2    GFR Estimate If Black >90 >60 mL/min/1.7m2    Calcium 8.1 (L) 8.5 - 10.1 mg/dL    Bilirubin Total 1.2 0.2 - 1.3 mg/dL    Albumin 2.9 (L) 3.4 - 5.0 g/dL    Protein Total 8.0 6.8 - 8.8 g/dL    Alkaline Phosphatase 105 40 - 150 U/L    ALT 21 0 - 50 U/L    AST 21 0 - 45 U/L   INR   Result Value Ref Range    INR 1.17 (H) 0.86 - 1.14   D dimer quantitative   Result Value Ref Range    D Dimer 2.9 (H) 0.0 - 0.50 ug/ml FEU   Nt probnp inpatient (BNP)   Result Value Ref Range    N-Terminal Pro BNP Inpatient 1908 (H) 0 - 450 pg/mL   Glucose by meter   Result Value Ref Range    Glucose 87 70 - 99 mg/dL   Troponin POCT   Result Value Ref Range    Troponin I 0.00 0.00 - 0.10 ug/L   ISTAT gases elec ica gluc kim POCT   Result Value Ref Range    Ph Venous 7.33 7.32 - 7.43 pH    PCO2 Venous 66 (H) 40 - 50 mm Hg    PO2 Venous 47 25 - 47 mm Hg    Bicarbonate  Venous 35 (H) 21 - 28 mmol/L    O2 Sat Venous 78 %    Sodium 142 133 - 144 mmol/L    Potassium 4.3 3.4 - 5.3 mmol/L    Glucose 93 70 - 99 mg/dL    Calcium Ionized 4.5 4.4 - 5.2 mg/dL    Hemoglobin 16.7 (H) 11.7 - 15.7 g/dL    Hematocrit - POCT 49 (H) 35.0 - 47.0 %PCV   Influenza A/B antigen   Result Value Ref Range    Influenza A/B Agn Specimen Nasal     Influenza A Negative NEG^Negative    Influenza B Negative NEG^Negative     Medications   albuterol neb solution 2.5 mg (2.5 mg Nebulization Given 12/14/17 0023)   ipratropium - albuterol 0.5 mg/2.5 mg/3 mL (DUONEB) neb solution 3 mL (3 mLs Nebulization Given 12/13/17 2249)   0.9% sodium chloride BOLUS (500 mLs Intravenous New Bag 12/13/17 2252)   diltiazem (CARDIZEM) injection 10 mg (10 mg Intravenous Given 12/13/17 2316)   sodium chloride 0.9 % bag 500mL for CT scan flush use (90 mLs As instructed Given 12/14/17 0002)   iopamidol (ISOVUE-370) solution 100 mL (100 mLs Intravenous Given 12/14/17 0001)   diltiazem (CARDIZEM) injection 10 mg (10 mg Intravenous Given 12/14/17 0116)          Labs Ordered and Resulted from Time of ED Arrival Up to the Time of Departure from the ED   CBC WITH PLATELETS DIFFERENTIAL - Abnormal; Notable for the following:        Result Value    RBC Count 5.79 (*)     Hematocrit 48.1 (*)     MCH 23.0 (*)     MCHC 27.7 (*)     RDW 18.6 (*)     Nucleated RBCs 2 (*)     All other components within normal limits   COMPREHENSIVE METABOLIC PANEL - Abnormal; Notable for the following:     Carbon Dioxide 34 (*)     Calcium 8.1 (*)     Albumin 2.9 (*)     All other components within normal limits   INR - Abnormal; Notable for the following:     INR 1.17 (*)     All other components within normal limits   D DIMER QUANTITATIVE - Abnormal; Notable for the following:     D Dimer 2.9 (*)     All other components within normal limits   NT PROBNP INPATIENT - Abnormal; Notable for the following:     N-Terminal Pro BNP Inpatient 1908 (*)     All other  components within normal limits   ISTAT GASES ELEC ICA GLUC JEWELL POCT - Abnormal; Notable for the following:     PCO2 Venous 66 (*)     Bicarbonate Venous 35 (*)     Hemoglobin 16.7 (*)     Hematocrit - POCT 49 (*)     All other components within normal limits   GLUCOSE BY METER   ROUTINE UA WITH MICROSCOPIC   CARDIAC CONTINUOUS MONITORING   ISTAT CG8 GAS ELEC ICA GLUC JEWELL NURSE POCT   TROPONIN POCT   INFLUENZA A/B ANTIGEN            Assessments & Plan (with Medical Decision Making)   47-year-old female who is severely obese on exam. She presented to the ER and was found to be hypoxic to the 70s on room air. Patient was tachypneic to the high 20s. Patient was placed on a monitor and was found to be in A. fib with a heart rate in the 130s. Patient states that she has not been feeling any palpitations. Patient s initial EKG showed no signs of heart strain but did show the persistent A. fib. I obtained labs that show that the patient has a mildly elevated PCO2 of 66. Patient had normal hemoglobin. White count is normal. Electrolytes are normal, including a creatinine of 0.72.  Patient s d-dimer is elevated at 2.9. BNP elevated 1908. Chest x-ray shows some cardiomegaly but otherwise no acute process. My differential for this patient includes possible PE versus hypoxia and heart strain from A. fib versus COPD. Patient will need to be admitted to the hospital for further care. Patient will be given some diltiazem for atrial fibrillation and taken to CT to rule out PE.  I discussed the plan of care with the patient and daughter agrees.    CT chest negative for PE; pt became rate controlled with 1 dose of dilt and than again became Afib RVR.  Will tx with 2nd dose.  Case was discussed with Card 1 staff. Pt will be admitted for further care.     This part of the document was transcribed by Britt Mcmillan Medical Scribe.     I have reviewed the nursing notes.    I have reviewed the findings, diagnosis, plan and need for follow  up with the patient.    New Prescriptions    No medications on file       Final diagnoses:   SOB (shortness of breath)   Hypoxia   New onset a-fib (H)   Hypercarbia       12/13/2017   [facility], [place], EMERGENCY DEPARTMENT     Tressa Ricketts MD  12/14/17 0142

## 2017-12-14 NOTE — PLAN OF CARE
Problem: Patient Care Overview  Goal: Plan of Care/Patient Progress Review  BP (!) 128/91 (BP Location: Left arm)  Pulse 138  Temp 98.9  F (37.2  C) (Axillary)  Resp (!) 38  Wt (!) 186.8 kg (411 lb 12.8 oz)  SpO2 96%    Pt admitted on 12/14 for new onset of A-Fib and increased SOB. AOx4, heart rate 100-130 bpm A-Fib. O2 sats above 95% with BiPap, ABG's to be drawn within next hour by RT. Cardizem gtt infusing in a R PIV @ 15 mg/hr, BPs maintaining within parameters. Given IV Lasix 40 mg x 2, voiding adequately. -BM during shift. SBA with transfers. Abdomen extremely distended with visible veins, bowel sounds noted in all 4 quadrants. +2/+3 edema in both upper and lower extremities. PRN Tylenol given x 1 for headache with relief. Currently NPO for any potential tests later today. Continue to monitor labs, test results, and respiratory status.     Will continue to monitor and notify Cards 1 of any pertinent changes.

## 2017-12-14 NOTE — H&P
[facility]   Cardiology 1  History and Physical      Date of Admission:  12/13/2017    Assessment & Plan   Courtney Anderson is a 47 year old female who presents with no significant past medical history who comes infor 2-3 weeks of worsening shortness of breath with minimal exertion, acutely worse over the past 48 hours.      Acute hypoxic and hypercapnic respiratory failure:   New onset atrial fibrillation with RVR:    - No prior evaluation for these symptoms. No formal diagnosis of COPD.  Likely has a component of obestiy hypoventilation syndrome. CT chest negative for PE. Pulmonary artery does appear large. NT-BNP is elevated. DDx includes congestive heart failure, pulmonary hypertension, possible underlying lung disease - but no PFTs on file. CXR without clear infiltrate.  Procal pending. White count normal.    - Lasix 40 mg given in the ED. Give additional Lasix 40 mg x 1.    - Bipap now to aid with pulmonary edema and hypercapnia .   - Formal echocardiogram in the AM.    - ABG pending.    - Strict intake/output.    - Continue diltiazem gtt for now for atrial fib control - unable to assess LV with bedside echo given poor windows, await formal echo results.    - Defer anticoagulation for Afib to day team.      FEN: NPO given tenuous respiratory status    DVT Prophylaxis: Heparin SQ per day team, vs start of anticoagulation.    Code Status: Full Code  Disposition: Expected discharge in >2 days pending further evaluation for worsening hypoxia and control of atrial fibrillation with RVR    Pt will be formally staffed in the AM by the Cards 1 team.      José Miguel De Oliveira  Internal Medicine PGY3  [phone]    Primary Care Physician   Physician No Ref-Primary    Chief Complaint   Shortness of breath     History is obtained from the patient    History of Present Illness   Courtney Anderson is a 47 year old female who presents with worsening shortness of breath with minimal exertion over the past 3-4 weeks and  with shortness of breath with exertion and rest over the past 48 hours prompting emergency room evaluation this evening. Notes progressively worsening shortness of breath with minimal exertion, associated with orthopnea and PND.  No dizziness, lightheadedness, syncope, or falls.  No prior history of lung, cardiac or other medical conditions. She has not seen a physician in a while.  Works as a PCA.  Primarily social smoker, no etoh or other drug use.     She endorses increased abdominal girth.  No change in her bowel habits. No dsyuria. Non productive cough over the past few weeks.  No fevers or chills. No nausea, vomiting.  No chest pain. Tolerating diet well.       Past Medical History    I have reviewed this patient's medical history and updated it with pertinent information if needed.   No significant past medical history.      Past Surgical History   I have reviewed this patient's surgical history and updated it with pertinent information if needed.  History reviewed. No pertinent surgical history.    Prior to Admission Medications   None     Allergies   No Known Allergies    Social History   Pt works as a PCA, lives in the [facility] with 2 kids and 2 grandchildren.    Smoke a few times a week with friends denies daily use.    Social etoh.   No other drug use.      Family History    Reviewed - brother  of an unknown lung condition.   No history of cardiac disease, sudden death, or autoimmune disease.      Review of Systems   The 10 point Review of Systems is negative other than noted in the HPI or here.     Physical Exam   Temp: 98.9  F (37.2  C) Temp src: Axillary BP: (!) 128/103 Pulse: 138 Heart Rate: 119 Resp: (!) 40 SpO2: 97 % O2 Device: Non-rebreather mask Oxygen Delivery: 10 LPM  Vital Signs with Ranges  Temp:  [97.3  F (36.3  C)-98.9  F (37.2  C)] 98.9  F (37.2  C)  Pulse:  [] 138  Heart Rate:  [112-138] 119  Resp:  [21-58] 40  BP: (100-143)/() 128/103  SpO2:  [78 %-100 %] 97 %  411 lbs  12.8 oz    GEN:  Alert, oriented x 3, moderate respiratory discomfort, no acute distress.   HEENT:  Normocephalic/atraumatic, no scleral icterus, no nasal discharge, mouth moist.  CV:  Irregularly irregular, JVP elevated > 14 cm. Difficult exam with neck size.   LUNGS:  Bibasilar crackles, but no wheezing or rhonchi.    ABD:  Obese, distended, but soft, non -tender. NA bowel sounds. Varicose veins throughout.    EXT:  Warm and well perfused 2 + pitting lower extremity edema.    SKIN:  Dry to touch, no exanthems noted in the visualized areas.  NEURO:  No new focal deficits appreciated.      Data   Data reviewed today:  I personally reviewed all the relevant labs, imaging, and EKG.      EKG: pending.  Atrial fibrillation on the monitor.        Recent Labs  Lab 12/14/17  0459 12/13/17  2247 12/13/17  2241 12/13/17  2240   WBC  --   --  10.3  --    HGB  --  16.7* 13.3  --    MCV  --   --  83  --    PLT  --   --  179  --    INR 1.20*  --  1.17*  --    NA  --  142 144  --    POTASSIUM  --  4.3 4.4  --    CHLORIDE  --   --  104  --    CO2  --   --  34*  --    BUN  --   --  14  --    CR  --   --  0.72  --    ANIONGAP  --   --  6  --    RUT  --   --  8.1*  --    GLC  --  93 92  --    ALBUMIN  --   --  2.9*  --    PROTTOTAL  --   --  8.0  --    BILITOTAL  --   --  1.2  --    ALKPHOS  --   --  105  --    ALT  --   --  21  --    AST  --   --  21  --    TROPONIN  --   --   --  0.00       Recent Results (from the past 24 hour(s))   XR Chest Port 1 View    Narrative    CHEST PORTABLE ONE VIEW  12/13/2017 10:53 PM      HISTORY: Shortness of breath.     COMPARISON: None.    FINDINGS: Upright portable chest. The heart is enlarged without  pulmonary edema. Allowing for body habitus, the lungs are clear. No  pneumothorax.      Impression    IMPRESSION: Cardiomegaly. No acute abnormality.    GLORY JONES MD   CT Chest Pulmonary Embolism w Contrast    Narrative    CT CHEST PULMONARY EMBOLISM W CONTRAST  12/14/2017 12:02 AM      HISTORY: Shortness of breath. Evaluate for pulmonary embolus.    TECHNIQUE: Volumetric acquisition of the chest after the  administration of 77 mL Isovue-370 IV contrast. Radiation dose for  this scan was reduced using automated exposure control, adjustment of  the mA and/or kV according to patient size, or iterative  reconstruction technique.     COMPARISON: None.    FINDINGS: No visualized pulmonary embolism. Suboptimal visualization  and enhancement of pulmonary arteries, particularly the smaller  peripheral pulmonary arteries, relating to body habitus. No large  central pulmonary emboli. Minimal linear atelectasis or scarring in  the lingula and lower lungs. No consolidation, pleural effusions or  pneumothorax. Heart size near upper limits of normal. No enlarged  mediastinal or hilar lymph nodes. Incidentally noted is a small amount  of fluid adjacent to the dome and lateral aspect of the liver.  Nonspecific subcutaneous edema. 2.3 cm right adrenal nodule.  Degenerative disc disease in the lower thoracic and lumbar spine.      Impression    IMPRESSION:  1. No visualized pulmonary embolism. Examination is limited by  suboptimal opacification and visualization of peripheral pulmonary  arteries.  2. No other acute findings in the chest.  3. Small amount of nonspecific fluid adjacent to the liver.  Nonspecific subcutaneous edema.  4. Small right adrenal nodule.    CEDRIC WICK MD

## 2017-12-14 NOTE — PROVIDER NOTIFICATION
12/14/17 1100   Call Information   Date of Call 12/14/17   Time of Call 1043   Name of person requesting the team Chauncey DILL   Title of person requesting team RN   RRT Arrival time 1045   Time RRT ended 1112   Reason for call   Type of RRT Adult   Primary reason for call Respiratory   Respiratory Rate greater than 24;O2sat less than 88% for greater than 5 minutes despite O2;Labored breathing   Was patient transferred from the ED, ICU, or PACU within last 24 hours prior to RRT call? Yes   SBAR   Situation Pt has had labored breathing with O2 sats in the upper 80's this AM. ABG was drawn, RRT was called in response to ABG results   Background No significant past medical history, admitted with 2-3 weeks of worsening shortness of breath with minimal exertion, acutely worse over the past 48 hours.   Assessment Pt was sitting at the bedside using accessory muscles. Had just been placed on Bipap, O2 sats in the 90's with Bipap. Continues to be in a-fib with rates 100's-110's on Diltiazem drip. Has had multiple doses of Lasix with good response   Interventions Other (describe)  (Recheck ABG in 1 hour, Follow potassium, Bedside ECHO)   Patient Outcome   Patient Outcome Stabilized on unit   RRT Team   Attending/Primary/Covering Physician Dr. Royal Cohn Attending Physician notified 12/14/17   Time Attending Physician notified 1040   Physician(s) Dr. José Miguel De Oliveira   Lead RN Kiana Dickinson   Post RRT Intervention Assessment   Post RRT Assessment [place] [facility]   Date Follow Up Done 12/14/17   Time Follow Up Done 1328   Comments Moved to 6B for closer monitoring

## 2017-12-14 NOTE — PLAN OF CARE
Transfer  Transferred from: 6C  Via:bed  Reason for transfer:Pt appropriate for 6B- worsened patient condition  Family: Aware of transfer  Belongings: Received with pt  Chart: Received with pt  Medications: Meds received from old unit with pt  2 RN Skin Assessment Completed By: Lizzette DAMICO and Renuka LEWIS  Report received from: Marilu  Pt status: Pt lethargic. Bipap on at 55%, with signifigant air leak. Placed large mask on pt with little difference in air leak. RT to draw STAT ABG. Will continue to monitor closely.

## 2017-12-14 NOTE — PROGRESS NOTES
Focus: A-Fib / Stomach Pain  Admitted from: [place] ED  Via: ambulance  Accompanied by: daughter  Belongings: at bedside  Admission paperwork: in progress  Teaching: Call don't fall, use of console, meal times, visiting hours, orientation to unit, when to call for the RN (angina/sob/dizzyness, etc.), and stressed the importance of safety.   Access: R PIV infusing Cardizem @ 10 mg/hour  Telemetry: yes  Ht./Wt.: yes

## 2017-12-14 NOTE — ED PROVIDER NOTES
Sign out Provider: Jamarcus  Sign out Plan: 48 y/o female with new onset atrial fibrillation with RVR and SOB with hypoxia.  Felt to be multi-factorial with underlying CHF, COPD and obesity hypoventilation syndrome.  Given two doses diltiazem and initiated on diltiazem infusion in addition to bumex.  Awaiting admission to cardiology.    Reassessment: Transport arrived, patient transferred to [place] cardiology for continued rate control and diuresis.      Disposition: Admission to cardiology           Yaquelin Luis MD  12/14/17 3381

## 2017-12-14 NOTE — PROVIDER NOTIFICATION
This RN spoke with Cards 1 and Pulmonary team RE: repeat ABG tonight. Both teams state that unless pt has a clinical change in appearance or mentation that they are ok not repeating an ABG at this time or planning to repeat in near future. Will continue to monitor and follow plan of care.

## 2017-12-14 NOTE — PLAN OF CARE
Neuro: A&Ox4. Lethargic, sleeping frequently.   Cardiac: A.Fib with rates 70-80's. VSS.   Respiratory: Sating 95 on 55% Bipap. C02- 80 after wearing bipap for 2.5 hours. ABG to be rechecked by RT at 1515.   GI/: Adequate urine output.   Diet/appetite: NPO.  Activity:  Assist of 1.  Pain: Denies pains or discomforts.   Skin: Intact, no new deficits noted.  LDA's: PIV    Plan: Continue with POC. Notify primary team with changes.

## 2017-12-14 NOTE — PHARMACY-ANTICOAGULATION SERVICE
Clinical Pharmacy - Warfarin Dosing Consult     Pharmacy has been consulted to manage this patient s warfarin therapy.  Indication: Atrial Fibrillation  Therapy Goal: INR 2-3  Provider/Team: Shawn IGNACIO Anticoag Clinic: N/A  Warfarin Prior to Admission: No  Significant drug interactions: None  Recent documented change in oral intake/nutrition: No    INR   Date Value Ref Range Status   12/14/2017 1.20 (H) 0.86 - 1.14 Final   12/13/2017 1.17 (H) 0.86 - 1.14 Final       Recommend warfarin 7.5 mg today given low bleed risk and no drug-drug interactions.  Pharmacy will monitor Courtney Anderson daily and order warfarin doses to achieve specified goal.      Please contact pharmacy as soon as possible if the warfarin needs to be held for a procedure or if the warfarin goals change.    Lizzette Castle, PharmD

## 2017-12-15 LAB
ALBUMIN SERPL ELPH-MCNC: 3.1 G/DL (ref 3.7–5.1)
ALPHA1 GLOB SERPL ELPH-MCNC: 0.4 G/DL (ref 0.2–0.4)
ALPHA2 GLOB SERPL ELPH-MCNC: 0.8 G/DL (ref 0.5–0.9)
ANION GAP SERPL CALCULATED.3IONS-SCNC: 5 MMOL/L (ref 3–14)
ANION GAP SERPL CALCULATED.3IONS-SCNC: 5 MMOL/L (ref 3–14)
B-GLOBULIN SERPL ELPH-MCNC: 1.1 G/DL (ref 0.6–1)
BASE EXCESS BLDA CALC-SCNC: 11.6 MMOL/L
BASE EXCESS BLDV CALC-SCNC: 13.1 MMOL/L
BUN SERPL-MCNC: 16 MG/DL (ref 7–30)
BUN SERPL-MCNC: 18 MG/DL (ref 7–30)
CALCIUM SERPL-MCNC: 8.6 MG/DL (ref 8.5–10.1)
CALCIUM SERPL-MCNC: 8.6 MG/DL (ref 8.5–10.1)
CHLORIDE SERPL-SCNC: 95 MMOL/L (ref 94–109)
CHLORIDE SERPL-SCNC: 96 MMOL/L (ref 94–109)
CO2 SERPL-SCNC: 39 MMOL/L (ref 20–32)
CO2 SERPL-SCNC: 39 MMOL/L (ref 20–32)
CREAT SERPL-MCNC: 0.88 MG/DL (ref 0.52–1.04)
CREAT SERPL-MCNC: 0.9 MG/DL (ref 0.52–1.04)
CREAT UR-MCNC: 30 MG/DL
ERYTHROCYTE [DISTWIDTH] IN BLOOD BY AUTOMATED COUNT: 19.5 % (ref 10–15)
FLUAV H1 2009 PAND RNA SPEC QL NAA+PROBE: NEGATIVE
FLUAV H1 RNA SPEC QL NAA+PROBE: NEGATIVE
FLUAV H3 RNA SPEC QL NAA+PROBE: NEGATIVE
FLUAV RNA SPEC QL NAA+PROBE: NEGATIVE
FLUBV RNA SPEC QL NAA+PROBE: NEGATIVE
GAMMA GLOB SERPL ELPH-MCNC: 1.7 G/DL (ref 0.7–1.6)
GFR SERPL CREATININE-BSD FRML MDRD: 66 ML/MIN/1.7M2
GFR SERPL CREATININE-BSD FRML MDRD: 68 ML/MIN/1.7M2
GLUCOSE SERPL-MCNC: 105 MG/DL (ref 70–99)
GLUCOSE SERPL-MCNC: 85 MG/DL (ref 70–99)
HADV DNA SPEC QL NAA+PROBE: NEGATIVE
HADV DNA SPEC QL NAA+PROBE: NEGATIVE
HCO3 BLD-SCNC: 40 MMOL/L (ref 21–28)
HCO3 BLDV-SCNC: 43 MMOL/L (ref 21–28)
HCT VFR BLD AUTO: 47.1 % (ref 35–47)
HGB BLD-MCNC: 12.3 G/DL (ref 11.7–15.7)
HMPV RNA SPEC QL NAA+PROBE: NEGATIVE
HPIV1 RNA SPEC QL NAA+PROBE: NEGATIVE
HPIV2 RNA SPEC QL NAA+PROBE: NEGATIVE
HPIV3 RNA SPEC QL NAA+PROBE: NEGATIVE
INR PPP: 1.2 (ref 0.86–1.14)
M PROTEIN SERPL ELPH-MCNC: 0 G/DL
MCH RBC QN AUTO: 22.4 PG (ref 26.5–33)
MCHC RBC AUTO-ENTMCNC: 26.1 G/DL (ref 31.5–36.5)
MCV RBC AUTO: 86 FL (ref 78–100)
MICROBIOLOGIST REVIEW: NORMAL
O2/TOTAL GAS SETTING VFR VENT: 70 %
O2/TOTAL GAS SETTING VFR VENT: ABNORMAL %
PCO2 BLD: 70 MM HG (ref 35–45)
PCO2 BLDV: 88 MM HG (ref 40–50)
PH BLD: 7.36 PH (ref 7.35–7.45)
PH BLDV: 7.3 PH (ref 7.32–7.43)
PLATELET # BLD AUTO: 152 10E9/L (ref 150–450)
PO2 BLD: 57 MM HG (ref 80–105)
PO2 BLDV: 19 MM HG (ref 25–47)
POTASSIUM SERPL-SCNC: 3.8 MMOL/L (ref 3.4–5.3)
POTASSIUM SERPL-SCNC: 4 MMOL/L (ref 3.4–5.3)
PROT PATTERN SERPL ELPH-IMP: ABNORMAL
PROT UR-MCNC: <0.05 G/L
PROT/CREAT 24H UR: NORMAL G/G CR (ref 0–0.2)
RBC # BLD AUTO: 5.49 10E12/L (ref 3.8–5.2)
RHINOVIRUS RNA SPEC QL NAA+PROBE: NEGATIVE
RSV RNA SPEC QL NAA+PROBE: NEGATIVE
RSV RNA SPEC QL NAA+PROBE: NEGATIVE
SODIUM SERPL-SCNC: 138 MMOL/L (ref 133–144)
SODIUM SERPL-SCNC: 140 MMOL/L (ref 133–144)
SPECIMEN SOURCE: NORMAL
WBC # BLD AUTO: 8.2 10E9/L (ref 4–11)

## 2017-12-15 PROCEDURE — 99232 SBSQ HOSP IP/OBS MODERATE 35: CPT | Mod: GC | Performed by: INTERNAL MEDICINE

## 2017-12-15 PROCEDURE — 25000128 H RX IP 250 OP 636: Performed by: INTERNAL MEDICINE

## 2017-12-15 PROCEDURE — 80048 BASIC METABOLIC PNL TOTAL CA: CPT | Performed by: STUDENT IN AN ORGANIZED HEALTH CARE EDUCATION/TRAINING PROGRAM

## 2017-12-15 PROCEDURE — 82803 BLOOD GASES ANY COMBINATION: CPT

## 2017-12-15 PROCEDURE — 85027 COMPLETE CBC AUTOMATED: CPT | Performed by: STUDENT IN AN ORGANIZED HEALTH CARE EDUCATION/TRAINING PROGRAM

## 2017-12-15 PROCEDURE — 85610 PROTHROMBIN TIME: CPT | Performed by: STUDENT IN AN ORGANIZED HEALTH CARE EDUCATION/TRAINING PROGRAM

## 2017-12-15 PROCEDURE — 25000132 ZZH RX MED GY IP 250 OP 250 PS 637: Performed by: STUDENT IN AN ORGANIZED HEALTH CARE EDUCATION/TRAINING PROGRAM

## 2017-12-15 PROCEDURE — 84165 PROTEIN E-PHORESIS SERUM: CPT | Performed by: STUDENT IN AN ORGANIZED HEALTH CARE EDUCATION/TRAINING PROGRAM

## 2017-12-15 PROCEDURE — 94640 AIRWAY INHALATION TREATMENT: CPT

## 2017-12-15 PROCEDURE — 36415 COLL VENOUS BLD VENIPUNCTURE: CPT

## 2017-12-15 PROCEDURE — 25000128 H RX IP 250 OP 636: Performed by: STUDENT IN AN ORGANIZED HEALTH CARE EDUCATION/TRAINING PROGRAM

## 2017-12-15 PROCEDURE — 82803 BLOOD GASES ANY COMBINATION: CPT | Performed by: INTERNAL MEDICINE

## 2017-12-15 PROCEDURE — 25000132 ZZH RX MED GY IP 250 OP 250 PS 637: Performed by: INTERNAL MEDICINE

## 2017-12-15 PROCEDURE — 00000402 ZZHCL STATISTIC TOTAL PROTEIN: Performed by: STUDENT IN AN ORGANIZED HEALTH CARE EDUCATION/TRAINING PROGRAM

## 2017-12-15 PROCEDURE — 40000275 ZZH STATISTIC RCP TIME EA 10 MIN

## 2017-12-15 PROCEDURE — 27210995 ZZH RX 272: Performed by: INTERNAL MEDICINE

## 2017-12-15 PROCEDURE — 36415 COLL VENOUS BLD VENIPUNCTURE: CPT | Performed by: INTERNAL MEDICINE

## 2017-12-15 PROCEDURE — 12000006 ZZH R&B IMCU INTERMEDIATE UMMC

## 2017-12-15 PROCEDURE — 36415 COLL VENOUS BLD VENIPUNCTURE: CPT | Performed by: STUDENT IN AN ORGANIZED HEALTH CARE EDUCATION/TRAINING PROGRAM

## 2017-12-15 PROCEDURE — 36600 WITHDRAWAL OF ARTERIAL BLOOD: CPT

## 2017-12-15 PROCEDURE — 80048 BASIC METABOLIC PNL TOTAL CA: CPT | Performed by: INTERNAL MEDICINE

## 2017-12-15 PROCEDURE — 94660 CPAP INITIATION&MGMT: CPT

## 2017-12-15 PROCEDURE — 25000125 ZZHC RX 250: Performed by: STUDENT IN AN ORGANIZED HEALTH CARE EDUCATION/TRAINING PROGRAM

## 2017-12-15 PROCEDURE — 94640 AIRWAY INHALATION TREATMENT: CPT | Mod: 76

## 2017-12-15 RX ORDER — ACETAZOLAMIDE 500 MG/5ML
250 INJECTION, POWDER, LYOPHILIZED, FOR SOLUTION INTRAVENOUS 2 TIMES DAILY
Status: DISCONTINUED | OUTPATIENT
Start: 2017-12-15 | End: 2017-12-15

## 2017-12-15 RX ORDER — FUROSEMIDE 10 MG/ML
40 INJECTION INTRAMUSCULAR; INTRAVENOUS
Status: DISCONTINUED | OUTPATIENT
Start: 2017-12-15 | End: 2017-12-16

## 2017-12-15 RX ORDER — ACETAZOLAMIDE 500 MG/5ML
250 INJECTION, POWDER, LYOPHILIZED, FOR SOLUTION INTRAVENOUS DAILY
Status: DISCONTINUED | OUTPATIENT
Start: 2017-12-16 | End: 2017-12-19

## 2017-12-15 RX ORDER — WARFARIN SODIUM 7.5 MG/1
7.5 TABLET ORAL
Status: COMPLETED | OUTPATIENT
Start: 2017-12-15 | End: 2017-12-15

## 2017-12-15 RX ORDER — FUROSEMIDE 10 MG/ML
20 INJECTION INTRAMUSCULAR; INTRAVENOUS ONCE
Status: COMPLETED | OUTPATIENT
Start: 2017-12-15 | End: 2017-12-15

## 2017-12-15 RX ORDER — FUROSEMIDE 10 MG/ML
20 INJECTION INTRAMUSCULAR; INTRAVENOUS
Status: DISCONTINUED | OUTPATIENT
Start: 2017-12-15 | End: 2017-12-15

## 2017-12-15 RX ADMIN — METOPROLOL TARTRATE 50 MG: 50 TABLET, FILM COATED ORAL at 20:54

## 2017-12-15 RX ADMIN — IPRATROPIUM BROMIDE 0.5 MG: 0.5 SOLUTION RESPIRATORY (INHALATION) at 12:30

## 2017-12-15 RX ADMIN — IPRATROPIUM BROMIDE 0.5 MG: 0.5 SOLUTION RESPIRATORY (INHALATION) at 16:16

## 2017-12-15 RX ADMIN — METOPROLOL TARTRATE 50 MG: 50 TABLET, FILM COATED ORAL at 09:21

## 2017-12-15 RX ADMIN — LEVALBUTEROL HYDROCHLORIDE 1.25 MG: 1.25 SOLUTION RESPIRATORY (INHALATION) at 08:29

## 2017-12-15 RX ADMIN — FUROSEMIDE 40 MG: 10 INJECTION, SOLUTION INTRAVENOUS at 16:13

## 2017-12-15 RX ADMIN — LEVALBUTEROL HYDROCHLORIDE 1.25 MG: 1.25 SOLUTION RESPIRATORY (INHALATION) at 12:29

## 2017-12-15 RX ADMIN — FUROSEMIDE 20 MG: 10 INJECTION, SOLUTION INTRAVENOUS at 11:17

## 2017-12-15 RX ADMIN — IPRATROPIUM BROMIDE 0.5 MG: 0.5 SOLUTION RESPIRATORY (INHALATION) at 08:29

## 2017-12-15 RX ADMIN — CEFTRIAXONE 2 G: 10 INJECTION, POWDER, FOR SOLUTION INTRAVENOUS at 16:10

## 2017-12-15 RX ADMIN — AZITHROMYCIN MONOHYDRATE 500 MG: 500 INJECTION, POWDER, LYOPHILIZED, FOR SOLUTION INTRAVENOUS at 16:12

## 2017-12-15 RX ADMIN — LEVALBUTEROL HYDROCHLORIDE 1.25 MG: 1.25 SOLUTION RESPIRATORY (INHALATION) at 19:46

## 2017-12-15 RX ADMIN — WARFARIN SODIUM 7.5 MG: 7.5 TABLET ORAL at 18:23

## 2017-12-15 RX ADMIN — ACETAZOLAMIDE 250 MG: 500 INJECTION, POWDER, LYOPHILIZED, FOR SOLUTION INTRAVENOUS at 09:22

## 2017-12-15 RX ADMIN — FUROSEMIDE 20 MG: 10 INJECTION, SOLUTION INTRAVENOUS at 09:24

## 2017-12-15 RX ADMIN — LEVALBUTEROL HYDROCHLORIDE 1.25 MG: 1.25 SOLUTION RESPIRATORY (INHALATION) at 16:16

## 2017-12-15 ASSESSMENT — ACTIVITIES OF DAILY LIVING (ADL)
ADLS_ACUITY_SCORE: 12

## 2017-12-15 NOTE — PLAN OF CARE
"Problem: Patient Care Overview  Goal: Plan of Care/Patient Progress Review  Outcome: Improving  /86 (BP Location: Right arm)  Pulse 79  Temp 97.8  F (36.6  C) (Axillary)  Resp 20  Ht 1.626 m (5' 4\")  Wt (!) 186.8 kg (411 lb 12.8 oz)  SpO2 90%  BMI 70.69 kg/m2    Neuro: A&Ox4. Mentation greatly improved from beginning of shift. Pt awake and states she feels a lot better and less drowsy.   Cardiac: A fib, rate controlled 80-90's. BP's WNL   Respiratory: Sating well on 55% FI02 Bipap. Per RT, do not titrate down.   GI/: Adequate urine output. Completing 24 hour urine.   Diet/appetite: NPO ex meds and ice chips. Tolerating well.   Activity:  Assist of 1  Pain: Denies   Skin: Intact, no new deficits noted. Vinay extremities with edema throughout  LDA's: Left PIV, saline locked.     Plan: Continue with POC. Notify primary team with changes.  Check ABG with AM labs. Respiratory viral panel sent. Awaiting results.         "

## 2017-12-15 NOTE — PROGRESS NOTES
CLINICAL NUTRITION SERVICES - BRIEF NOTE    Consult: Provider-Weight Loss Management- Cristian Elizabeth MD    For full nutrition assessment please see previous RD note, pt with current acute illness.    Recommend consult for outpatient dietitian apptointment       Celina Mejía RD, MS, LD  6B- Pager: [phone]

## 2017-12-15 NOTE — PROGRESS NOTES
[facility]    Pulmonary, Allergy, Critical Care and Sleep Medicine    Pulmonary Consult           Assessment and Plan:   48 yo woman with morbid obesity estimated BMI about 68 who is admitted on 12/13 due to 2-3 weeks worsening of SOB on minimal exertion with peripheral edema and found to have new onset A-fib with RVR.   .  # Obesity hypoventilation syndrome, # suspected HILLARY  # Hypoxemic and hypercapnic respiratory failure, acute on chronic  # CHF, New onset Atrial fibrillation with RVR  This patient meets the criteria of OHS. ABG at 2pm 12/14 today pH7.30/PCO2 80/PO2 76/HCO3-40 is consistent with mostly chronic process mixed with contraction alkalosis due to diuretics. ABG is slightly improved after BIPAP use to pH7.34/ 76/59/41.   Rate controlled after diltiazem drip. On metoprolol 50 mg po BID, started to be on Coumadin.   On Lasix 40mg Q 12 hours currently with significant BL leg edema.   Since the patients  needs to continue diuresis still, giving acetazolamide with daily monitoring of HCO3- on BMP is recommended at this point. She is clinically stable for now.  Her mental status is signigicantly better today. Suspected her baseline OHS was worsen by current fluid overload/ CHF.      -c/w BIPAP with current setting at any sleep and QHS.    -consider using acetazolamide 250mg IV Q 24hours, aiming for HCO3- <35-40. Do not prescribe acetazolamide on discharge.    -daily ABG check in AM, +as needed when the patient metal status changes.   -make sure to have sleep medicine follow up for sleep study.      I discussed and saw this case with Dr. Mireles.     Maria Del Rosario Wylie MD  Pulmonary Critical Care Fellow  [phone]         Interval History:     Markedly improved mental status today. No disorientation or somnolence today.            Review of Systems:   C: negative for fever, chills, change in weight  INTEGUMENTARY/SKIN: no rash or obvious new lesions  ENT/MOUTH: no sore throat, new sinus pain  or nasal drainage  RESP: see interval history  CV: negative for chest pain, palpitations or peripheral edema  GI: no nausea, vomiting, change in stools  : no dysuria  MUSCULOSKELETAL: no myalgias, arthralgias  ENDOCRINE: blood sugars with adequate control  PSYCHIATRIC: mood stable          Medications:       acetaZOLAMIDE  250 mg Intravenous BID     furosemide  40 mg Intravenous BID     furosemide  20 mg Intravenous Once     cefTRIAXone  2 g Intravenous Q24H     sodium chloride (PF)  3 mL Intracatheter Q8H     metoprolol  50 mg Oral BID     levalbuterol  1.25 mg Nebulization 4x daily     ipratropium  0.5 mg Nebulization 4x daily     azithromycin  500 mg Intravenous Q24H     lidocaine (buffered or not buffered), lidocaine 4%, sodium chloride (PF), - MEDICATION INSTRUCTIONS -, acetaminophen, acetaminophen, - MEDICATION INSTRUCTIONS -, hypromellose-dextran, - MEDICATION INSTRUCTIONS -, Warfarin Therapy Reminder         Physical Exam:   Temp:  [97.6  F (36.4  C)-98.5  F (36.9  C)] 97.6  F (36.4  C)  Pulse:  [79] 79  Heart Rate:  [70-94] 77  Resp:  [19-34] 28  BP: ()/() 106/70  FiO2 (%):  [35 %-55 %] 55 %  SpO2:  [86 %-96 %] 93 %    Intake/Output Summary (Last 24 hours) at 12/15/17 1031  Last data filed at 12/15/17 0900   Gross per 24 hour   Intake           243.25 ml   Output             4680 ml   Net         -4436.75 ml     Constitutional:   Awake, alert and in no apparent distress   Eyes:   nonicteric   ENT:    oral mucosa moist without lesions   Neck:   Supple without supraclavicular or cervical lymphadenopathy   Lungs:   Good air flow.  No crackles. No rhonchi.  No wheezes.   Cardiovascular:   Normal S1 and S2.  RRR.  No murmur, gallop or rub.   Abdomen:   NABS, soft, nontender, nondistended.  No HSM.   Musculoskeletal:   2+ pitting No edema LE   Neurologic:   Alert and conversant.   Skin:   Warm, dry.  No rash on limited exam.             Data:   All laboratory and imaging data reviewed.     Data:  CMP  Recent Labs  Lab 12/15/17  0637 12/14/17  1115 12/14/17  0459 12/13/17  2247 12/13/17  2241    141 140 142 144   POTASSIUM 4.0 4.5 4.4 4.3 4.4   CHLORIDE 96 98 100  --  104   CO2 39* 40* 36*  --  34*   ANIONGAP 5 3 5  --  6   GLC 85 110* 117* 93 92   BUN 16 14 14  --  14   CR 0.90 0.87 0.80  --  0.72   GFRESTIMATED 66 69 76  --  86   GFRESTBLACK 80 83 >90  --  >90   RUT 8.6 8.3* 8.2*  --  8.1*   MAG  --  2.0 2.1  --   --    PROTTOTAL  --   --   --   --  8.0   ALBUMIN  --   --   --   --  2.9*   BILITOTAL  --   --   --   --  1.2   ALKPHOS  --   --   --   --  105   AST  --   --   --   --  21   ALT  --   --   --   --  21     CBC  Recent Labs  Lab 12/15/17  0637 12/14/17  0459 12/13/17 2247 12/13/17 2241   WBC 8.2 10.4  --  10.3   RBC 5.49* 5.64*  --  5.79*   HGB 12.3 12.6 16.7* 13.3   HCT 47.1* 48.1*  --  48.1*   MCV 86 85  --  83   MCH 22.4* 22.3*  --  23.0*   MCHC 26.1* 26.2*  --  27.7*   RDW 19.5* 19.4*  --  18.6*    158  --  179     INR  Recent Labs  Lab 12/15/17  0637 12/14/17  0459 12/13/17 2241   INR 1.20* 1.20* 1.17*     Arterial Blood Gas  Recent Labs  Lab 12/15/17  0836 12/14/17  1605 12/14/17  1345 12/14/17  1020   PH  --  7.34* 7.30* 7.30*   PCO2  --  76* 80* 78*   PO2  --  59* 76* 70*   HCO3  --  41* 40* 38*   O2PER 7L 35 55 10 L     Urine Studies  Recent Labs   Lab Test  12/14/17   1107   URINEPH  5.0   NITRITE  Negative   LEUKEST  Negative   WBCU  1     CMV viral loads  No lab results found.  No results found for: CMQNT, CMVQ  EBV viral loads   No lab results found.  No results found for: EBVDN, EBRES, EBVDN, EBVSP, EBVPC, EBVPCR  Respiratory Virus Testing    No results found for: RS, FLUAG

## 2017-12-15 NOTE — PROGRESS NOTES
CLINICAL NUTRITION SERVICES - ASSESSMENT NOTE     Nutrition Prescription    RECOMMENDATIONS FOR MDs/PROVIDERS TO ORDER:  If pt is consuming less than 50% of meals, then rec order kcal counts.     Future/Additional Recommendations:  1. Diet advancement as per team when medically appropriate. Pt may need a low-sodium diet restriction once diet is able to be advanced. Fluid restriction as per team.   2. If oral intake is not improving, order a multivitamin with minerals.  3. Monitor need for lyte replacement (Phos, K+, and Mg++) due to pt's reported poor oral intake PTA. Note, lytes WNL when last checked and urinary ketones were negative on urinalysis 12/14.   4. Rec thiamine (100 mg/day) if pt has NYHA Class III-IV heart failure.     5. If pt should need TFs this admission (if respiratory status declines), would rec place feeding tube, order Prosource TF modular (1 pkt TID) and initiate TFs, TwoCal HN (concentrated, maintenance TF formula). Initiate TFs at 10 mL/hr, advancing rate by 10 mL Q 8 hrs (or per provider discretion, pending hemodynamic stability) to goal rate of 40 mL/hr. This provides 960 mL TF, 1920+ kcals (22+ kcal/kg/day), 81+ g PRO (0.9+ g/kg/day), 672 mL H2O, 210 g CHO and 5 g Fiber daily. Each packet of ProSource TF modular provides an additional 40 kcals and 11 g protein.         If begin tube feeds:    - Flush feeding tube (FT) with 30 mL water Q 4 hrs for patency. Rec provider adjust free water flushes as needed, pending fluid status.   - Ensure K+/Mg++/Phos labs are ordered daily until TFs advance to goal infusion to evaluate for sx of refeeding with nutrition received.  If lytes trend low, aggressively replace lytes.       - If not already ordered, order a multivitamin/mineral (certavite 15 mL/day via FT) to help ensure micronutrient needs being met with suspected hypermetabolic demands and potential interruptions to TF infusions.   - Monitor TF and possible need to adjust nutrition support  "regimen if necessary, pending medical course and nutrition status.       - If nutrition support is started, then rec order a metabolic cart study to best assess kcal needs.      REASON FOR ASSESSMENT  Courtney Anderson is a/an 47 year old female assessed by the dietitian for Admission Nutrition Risk Screen for reduced oral intake over the last month (r/t stomach bloating).    NUTRITION HISTORY  Per H & P, pt admitted for two to three weeks of worsening shortness of breath with minimal exertion, acutely worse over the past 48 hours. Pt works as a PCA. H & P indicates no N/V or change in bowel habits and tolerating diet well. Pt stated she noticed an increase in abdominal girth.  Unable to obtain nutrition history. Pt was busy with MD on first attempt and then RN was entering room on second attempt.     CURRENT NUTRITION ORDERS  Diet: NPO for possible procedure. Previously on a cardiac diet with no caffeine.  Intake/Tolerance: No intake recorded so far this admission. Pt was just recently admitted.    LABS  Labs reviewed    MEDICATIONS  Medications reviewed    ANTHROPOMETRICS  Height: 162.6 cm (5' 4\")  Most Recent Weight: (!) 183 kg (403 lb 7.1 oz)    IBW: 54.5 kg   BMI: (Morbid) Obesity Grade III BMI >40  Weight History: Pt-reported wt of 230# (104.5 kg) on 3/20/16. No further weights available via chart review.   Dosing Weight: 87 kg (adjusted, based on lowest wt this admission of 183 kg on 12/15)    ASSESSED NUTRITION NEEDS  Estimated Energy Needs: 8383-8468 kcals/day (20 - 25 kcals/kg)  Justification: Estimated needs with wt status  Estimated Protein Needs:  grams protein/day (1.1 - 1.4 grams of pro/kg)  Justification: Increased needs with cardiac status  Estimated Fluid Needs: 9089-9268 mL/day (20-25 mL/kg)   Justification: or per provider pending fluid status     PHYSICAL FINDINGS  See malnutrition section below.    MALNUTRITION  % Intake: Unable to assess. May be meeting this criteria as received admission " nutrition risk screen.  % Weight Loss: Unable to assess due to limited wt history and diuresis now this admission  Subcutaneous Fat Loss: Unable to assess. Pt was busy with MD on first attempt and then RN was entering room on second attempt.   Muscle Loss:  Unable to assess. Pt was busy with MD on first attempt and then RN was entering room on second attempt.   Fluid Accumulation/Edema: Mild  Malnutrition Diagnosis: Unable to determine. Pt was busy with MD on first attempt and then RN was entering room on second attempt.     NUTRITION DIAGNOSIS  Predicted inadequate nutrient intake (protein-energy) related to received admission nutrition risk screen for reduced oral intake for the past month.       INTERVENTIONS  Implementation  Nutrition Education: Per provider order if indicated.    Goals  Patient to consume % of nutritionally adequate meal trays TID, or the equivalent with supplements/snacks.     Monitoring/Evaluation  Progress toward goals will be monitored and evaluated per protocol.     Demetra Hunter, MS, RD, LD, [facility]     Nutrition will continue to follow. 6B MARI Pgr:  [phone]

## 2017-12-15 NOTE — PLAN OF CARE
Problem: Patient Care Overview  Goal: Plan of Care/Patient Progress Review  Pt alert and oriented x4. Pt O2 needs increased today to 70 % FIO2. Pt changed from NC to Oxyplus mask 8 L after she was taken off BiPAP this am. CO2 88 on VBG and team notified. Pt placed on Vapotherm this afternoon and awaiting an ABG result to see if there are changes to CO2. Pt ambulatory and steady on feet with stand by assist. Diet advanced from NPO status and tolerating well. Lasix given x2 with adequate results. Will continue to monitor per plan of care.

## 2017-12-15 NOTE — PLAN OF CARE
"Problem: Arrhythmia/Dysrhythmia (Symptomatic) (Adult)  Goal: Signs and Symptoms of Listed Potential Problems Will be Absent, Minimized or Managed (Arrhythmia/Dysrhythmia)  Signs and symptoms of listed potential problems will be absent, minimized or managed by discharge/transition of care (reference Arrhythmia/Dysrhythmia (Symptomatic) (Adult) CPG).   Outcome: No Change  BP 99/78 (BP Location: Left arm)  Pulse 79  Temp 98.5  F (36.9  C) (Axillary)  Resp 19  Ht 1.626 m (5' 4\")  Wt (!) 183 kg (403 lb 7.1 oz)  SpO2 90%  BMI 69.25 kg/m2  HR a-fib rates 70-80, on bipap at 55% FiO2. Maintaining adequate O2 sats.  Pt A&Ox4, resting comfortably. Daughter at bedside. Call light at hand, bed alarm set. Pt has had no call light use tonight. Pt denies pain. Pt due to void, last void 2200. Continuing 24 hour urine collection. No BM over night. Plan for ABG recheck this AM. Nursing will continue to follow the POC and update the MD team with concerns.        "

## 2017-12-15 NOTE — PROGRESS NOTES
"  Cardiology  Progress Note    Courtney Anderson ([id number]) admitted on 12/13/2017  12/15/2017    Subjective   Overnight, patient was taken off the BiPap without complications. Patient is feeling much better from a respiratory standpoint today.     Objective   Most recent vital signs:  BP 93/73 (BP Location: Right arm)  Pulse 79  Temp 98  F (36.7  C)  Resp 28  Ht 1.626 m (5' 4\")  Wt (!) 183 kg (403 lb 7.1 oz)  SpO2 90%  BMI 69.25 kg/m2  Temp:  [97.6  F (36.4  C)-98.5  F (36.9  C)] 98  F (36.7  C)  Pulse:  [79] 79  Heart Rate:  [70-88] 72  Resp:  [19-34] 28  BP: ()/(53-91) 93/73  FiO2 (%):  [35 %-70 %] 70 %  SpO2:  [86 %-96 %] 90 %  Wt Readings from Last 2 Encounters:   12/15/17 (!) 183 kg (403 lb 7.1 oz)       Intake/Output Summary (Last 24 hours) at 12/14/17 1737  Last data filed at 12/14/17 1700   Gross per 24 hour   Intake           473.17 ml   Output             4200 ml   Net         -3726.83 ml       Physical exam:  General: AAOx3, no clubbing/cyanosis, 1+ edema, Can't appreciate JV  HEENT:  Supple   Cardiac: RRR. No m/r/g. Normal S1, S2.   Pulm: unable to assess for lung sounds  Abd: +BS, soft, non distended, non tender  Skin: No new rash/petechiae  MSK: No new deformities  Neuro:  No new focal deficits    Labs (Past three days):  Reviewed and remarkable for: reviewed    Imaging/procedure results:   Reviewed and remarkable for:   Recent Results (from the past 48 hour(s))   XR Chest Port 1 View    Narrative    CHEST PORTABLE ONE VIEW  12/13/2017 10:53 PM      HISTORY: Shortness of breath.     COMPARISON: None.    FINDINGS: Upright portable chest. The heart is enlarged without  pulmonary edema. Allowing for body habitus, the lungs are clear. No  pneumothorax.      Impression    IMPRESSION: Cardiomegaly. No acute abnormality.    GLORY JONES MD   CT Chest Pulmonary Embolism w Contrast    Narrative    CT CHEST PULMONARY EMBOLISM W CONTRAST  12/14/2017 12:02 AM     HISTORY: Shortness of breath. " Evaluate for pulmonary embolus.    TECHNIQUE: Volumetric acquisition of the chest after the  administration of 77 mL Isovue-370 IV contrast. Radiation dose for  this scan was reduced using automated exposure control, adjustment of  the mA and/or kV according to patient size, or iterative  reconstruction technique.     COMPARISON: None.    FINDINGS: No visualized pulmonary embolism. Suboptimal visualization  and enhancement of pulmonary arteries, particularly the smaller  peripheral pulmonary arteries, relating to body habitus. No large  central pulmonary emboli. Minimal linear atelectasis or scarring in  the lingula and lower lungs. No consolidation, pleural effusions or  pneumothorax. Heart size near upper limits of normal. No enlarged  mediastinal or hilar lymph nodes. Incidentally noted is a small amount  of fluid adjacent to the dome and lateral aspect of the liver.  Nonspecific subcutaneous edema. 2.3 cm right adrenal nodule.  Degenerative disc disease in the lower thoracic and lumbar spine.      Impression    IMPRESSION:  1. No visualized pulmonary embolism. Examination is limited by  suboptimal opacification and visualization of peripheral pulmonary  arteries.  2. No other acute findings in the chest.  3. Small amount of nonspecific fluid adjacent to the liver.  Nonspecific subcutaneous edema.  4. Small right adrenal nodule.    CEDRIC WICK MD       Assessment & Plan   Courtney Anderson is a 47 year old female who presents with no significant past medical history who comes infor 2-3 weeks of worsening shortness of breath with minimal exertion, acutely worse over the past 48 hours.     #Acute on Chronic Hypercarbic Respiratory Failure   -This is likely multifactorial from OHS and HFpEF.   -This is improving as patient is not requiring BiPap during the day.   -Continue IV Diuresis with goal net negative 2-3 liters.  -Given that patient was worsening yesterday, would continue Abx despite suspicion of PNA being  low. Will follow up on RVP.   -Diamox 250mg once a day to prevent worsening metabolic alkalosis.     #Atrial Fibrillation  -Rate Controlled.  -Despite CHADsVasc being 1, she is at high risk of CVA given her co-morbid conditions such as obesity.   -Continue Warfarin   -Continue Metoprolol 50mg BID.     # Morbid obesity  -PT/OT   -Nutrition consult     Cristian Elizabeth PGY-4   Cardiology Fellow   Pager: [phone]

## 2017-12-16 ENCOUNTER — APPOINTMENT (OUTPATIENT)
Dept: GENERAL RADIOLOGY | Facility: CLINIC | Age: 47
DRG: 291 | End: 2017-12-16
Payer: COMMERCIAL

## 2017-12-16 LAB
ANION GAP SERPL CALCULATED.3IONS-SCNC: 5 MMOL/L (ref 3–14)
ANION GAP SERPL CALCULATED.3IONS-SCNC: 8 MMOL/L (ref 3–14)
BASE EXCESS BLDA CALC-SCNC: 11.3 MMOL/L
BASE EXCESS BLDV CALC-SCNC: 14 MMOL/L
BUN SERPL-MCNC: 17 MG/DL (ref 7–30)
BUN SERPL-MCNC: 20 MG/DL (ref 7–30)
CALCIUM SERPL-MCNC: 8.4 MG/DL (ref 8.5–10.1)
CALCIUM SERPL-MCNC: 8.9 MG/DL (ref 8.5–10.1)
CHLORIDE SERPL-SCNC: 94 MMOL/L (ref 94–109)
CHLORIDE SERPL-SCNC: 98 MMOL/L (ref 94–109)
CO2 SERPL-SCNC: 37 MMOL/L (ref 20–32)
CO2 SERPL-SCNC: 39 MMOL/L (ref 20–32)
CREAT SERPL-MCNC: 0.8 MG/DL (ref 0.52–1.04)
CREAT SERPL-MCNC: 0.93 MG/DL (ref 0.52–1.04)
ERYTHROCYTE [DISTWIDTH] IN BLOOD BY AUTOMATED COUNT: 19.4 % (ref 10–15)
GFR SERPL CREATININE-BSD FRML MDRD: 64 ML/MIN/1.7M2
GFR SERPL CREATININE-BSD FRML MDRD: 76 ML/MIN/1.7M2
GLUCOSE SERPL-MCNC: 83 MG/DL (ref 70–99)
GLUCOSE SERPL-MCNC: 98 MG/DL (ref 70–99)
HCO3 BLD-SCNC: 40 MMOL/L (ref 21–28)
HCO3 BLDV-SCNC: 43 MMOL/L (ref 21–28)
HCT VFR BLD AUTO: 45.5 % (ref 35–47)
HGB BLD-MCNC: 12 G/DL (ref 11.7–15.7)
INR PPP: 1.29 (ref 0.86–1.14)
MCH RBC QN AUTO: 22.3 PG (ref 26.5–33)
MCHC RBC AUTO-ENTMCNC: 26.4 G/DL (ref 31.5–36.5)
MCV RBC AUTO: 85 FL (ref 78–100)
O2/TOTAL GAS SETTING VFR VENT: ABNORMAL %
O2/TOTAL GAS SETTING VFR VENT: ABNORMAL %
OXYHGB MFR BLD: 90 % (ref 92–100)
OXYHGB MFR BLDV: 26 %
PCO2 BLD: 75 MM HG (ref 35–45)
PCO2 BLDV: 77 MM HG (ref 40–50)
PH BLD: 7.33 PH (ref 7.35–7.45)
PH BLDV: 7.36 PH (ref 7.32–7.43)
PLATELET # BLD AUTO: 137 10E9/L (ref 150–450)
PO2 BLD: 75 MM HG (ref 80–105)
PO2 BLDV: 21 MM HG (ref 25–47)
POTASSIUM SERPL-SCNC: 3.5 MMOL/L (ref 3.4–5.3)
POTASSIUM SERPL-SCNC: 3.5 MMOL/L (ref 3.4–5.3)
PROCALCITONIN SERPL-MCNC: 0.09 NG/ML
RBC # BLD AUTO: 5.37 10E12/L (ref 3.8–5.2)
SODIUM SERPL-SCNC: 139 MMOL/L (ref 133–144)
SODIUM SERPL-SCNC: 141 MMOL/L (ref 133–144)
WBC # BLD AUTO: 7.7 10E9/L (ref 4–11)

## 2017-12-16 PROCEDURE — 71010 XR CHEST PORT 1 VW: CPT

## 2017-12-16 PROCEDURE — 80048 BASIC METABOLIC PNL TOTAL CA: CPT | Performed by: INTERNAL MEDICINE

## 2017-12-16 PROCEDURE — 25000128 H RX IP 250 OP 636: Performed by: INTERNAL MEDICINE

## 2017-12-16 PROCEDURE — 36415 COLL VENOUS BLD VENIPUNCTURE: CPT | Performed by: INTERNAL MEDICINE

## 2017-12-16 PROCEDURE — 85027 COMPLETE CBC AUTOMATED: CPT | Performed by: INTERNAL MEDICINE

## 2017-12-16 PROCEDURE — 84132 ASSAY OF SERUM POTASSIUM: CPT | Performed by: INTERNAL MEDICINE

## 2017-12-16 PROCEDURE — 25000132 ZZH RX MED GY IP 250 OP 250 PS 637: Performed by: INTERNAL MEDICINE

## 2017-12-16 PROCEDURE — 82805 BLOOD GASES W/O2 SATURATION: CPT | Performed by: INTERNAL MEDICINE

## 2017-12-16 PROCEDURE — 85610 PROTHROMBIN TIME: CPT | Performed by: INTERNAL MEDICINE

## 2017-12-16 PROCEDURE — 12000006 ZZH R&B IMCU INTERMEDIATE UMMC

## 2017-12-16 PROCEDURE — 25000132 ZZH RX MED GY IP 250 OP 250 PS 637: Performed by: STUDENT IN AN ORGANIZED HEALTH CARE EDUCATION/TRAINING PROGRAM

## 2017-12-16 PROCEDURE — 36600 WITHDRAWAL OF ARTERIAL BLOOD: CPT

## 2017-12-16 PROCEDURE — 84132 ASSAY OF SERUM POTASSIUM: CPT | Performed by: STUDENT IN AN ORGANIZED HEALTH CARE EDUCATION/TRAINING PROGRAM

## 2017-12-16 PROCEDURE — 94660 CPAP INITIATION&MGMT: CPT

## 2017-12-16 PROCEDURE — 25000128 H RX IP 250 OP 636: Performed by: STUDENT IN AN ORGANIZED HEALTH CARE EDUCATION/TRAINING PROGRAM

## 2017-12-16 PROCEDURE — 25000125 ZZHC RX 250: Performed by: STUDENT IN AN ORGANIZED HEALTH CARE EDUCATION/TRAINING PROGRAM

## 2017-12-16 PROCEDURE — 99232 SBSQ HOSP IP/OBS MODERATE 35: CPT | Mod: GC | Performed by: INTERNAL MEDICINE

## 2017-12-16 PROCEDURE — 40000275 ZZH STATISTIC RCP TIME EA 10 MIN

## 2017-12-16 PROCEDURE — 94640 AIRWAY INHALATION TREATMENT: CPT

## 2017-12-16 PROCEDURE — 84145 PROCALCITONIN (PCT): CPT | Performed by: INTERNAL MEDICINE

## 2017-12-16 RX ORDER — AZITHROMYCIN 250 MG/1
500 TABLET, FILM COATED ORAL DAILY
Status: DISCONTINUED | OUTPATIENT
Start: 2017-12-16 | End: 2017-12-16

## 2017-12-16 RX ORDER — LEVALBUTEROL INHALATION SOLUTION 1.25 MG/3ML
1.25 SOLUTION RESPIRATORY (INHALATION) EVERY 4 HOURS PRN
Status: DISCONTINUED | OUTPATIENT
Start: 2017-12-16 | End: 2017-12-20 | Stop reason: HOSPADM

## 2017-12-16 RX ORDER — POTASSIUM CHLORIDE 7.45 MG/ML
10 INJECTION INTRAVENOUS
Status: DISCONTINUED | OUTPATIENT
Start: 2017-12-16 | End: 2017-12-16 | Stop reason: CLARIF

## 2017-12-16 RX ORDER — POTASSIUM CHLORIDE 29.8 MG/ML
20 INJECTION INTRAVENOUS
Status: DISCONTINUED | OUTPATIENT
Start: 2017-12-16 | End: 2017-12-16 | Stop reason: CLARIF

## 2017-12-16 RX ORDER — FUROSEMIDE 10 MG/ML
40 INJECTION INTRAMUSCULAR; INTRAVENOUS 3 TIMES DAILY
Status: DISCONTINUED | OUTPATIENT
Start: 2017-12-16 | End: 2017-12-19

## 2017-12-16 RX ORDER — POTASSIUM CHLORIDE 1.5 G/1.58G
20-40 POWDER, FOR SOLUTION ORAL
Status: DISCONTINUED | OUTPATIENT
Start: 2017-12-16 | End: 2017-12-20 | Stop reason: HOSPADM

## 2017-12-16 RX ORDER — MAGNESIUM SULFATE HEPTAHYDRATE 40 MG/ML
4 INJECTION, SOLUTION INTRAVENOUS EVERY 4 HOURS PRN
Status: DISCONTINUED | OUTPATIENT
Start: 2017-12-16 | End: 2017-12-20 | Stop reason: HOSPADM

## 2017-12-16 RX ORDER — POTASSIUM CHLORIDE 750 MG/1
40 TABLET, EXTENDED RELEASE ORAL ONCE
Status: COMPLETED | OUTPATIENT
Start: 2017-12-16 | End: 2017-12-16

## 2017-12-16 RX ORDER — POTASSIUM CHLORIDE 750 MG/1
20-40 TABLET, EXTENDED RELEASE ORAL
Status: DISCONTINUED | OUTPATIENT
Start: 2017-12-16 | End: 2017-12-20 | Stop reason: HOSPADM

## 2017-12-16 RX ORDER — POTASSIUM CL/LIDO/0.9 % NACL 10MEQ/0.1L
10 INTRAVENOUS SOLUTION, PIGGYBACK (ML) INTRAVENOUS
Status: DISCONTINUED | OUTPATIENT
Start: 2017-12-16 | End: 2017-12-20 | Stop reason: HOSPADM

## 2017-12-16 RX ORDER — WARFARIN SODIUM 5 MG/1
10 TABLET ORAL
Status: COMPLETED | OUTPATIENT
Start: 2017-12-16 | End: 2017-12-16

## 2017-12-16 RX ADMIN — POTASSIUM CHLORIDE 40 MEQ: 750 TABLET, EXTENDED RELEASE ORAL at 09:53

## 2017-12-16 RX ADMIN — ACETAZOLAMIDE 250 MG: 500 INJECTION, POWDER, LYOPHILIZED, FOR SOLUTION INTRAVENOUS at 08:55

## 2017-12-16 RX ADMIN — FUROSEMIDE 40 MG: 10 INJECTION, SOLUTION INTRAVENOUS at 08:54

## 2017-12-16 RX ADMIN — IPRATROPIUM BROMIDE 0.5 MG: 0.5 SOLUTION RESPIRATORY (INHALATION) at 09:29

## 2017-12-16 RX ADMIN — ACETAMINOPHEN 650 MG: 325 TABLET, FILM COATED ORAL at 11:29

## 2017-12-16 RX ADMIN — LEVALBUTEROL HYDROCHLORIDE 1.25 MG: 1.25 SOLUTION RESPIRATORY (INHALATION) at 09:29

## 2017-12-16 RX ADMIN — METOPROLOL TARTRATE 50 MG: 50 TABLET, FILM COATED ORAL at 08:54

## 2017-12-16 RX ADMIN — FUROSEMIDE 40 MG: 10 INJECTION, SOLUTION INTRAVENOUS at 20:49

## 2017-12-16 RX ADMIN — WARFARIN SODIUM 10 MG: 5 TABLET ORAL at 17:25

## 2017-12-16 RX ADMIN — FUROSEMIDE 40 MG: 10 INJECTION, SOLUTION INTRAVENOUS at 15:01

## 2017-12-16 ASSESSMENT — ACTIVITIES OF DAILY LIVING (ADL)
ADLS_ACUITY_SCORE: 11

## 2017-12-16 NOTE — PLAN OF CARE
"Problem: Patient Care Overview  Goal: Plan of Care/Patient Progress Review  Outcome: Therapy, progress toward functional goals as expected  /75 (BP Location: Right arm)  Pulse 79  Temp 97.7  F (36.5  C) (Axillary)  Resp 24  Ht 1.626 m (5' 4\")  Wt (!) 179.9 kg (396 lb 11.2 oz)  SpO2 94%  BMI 68.09 kg/m2    Cognitive: A&Ox4.   Cardiac: A fib sustains 60-90's. -110's.   Resp: Oxymask at 9L while awake. BIPAP at 55% while asleep.   GI/: Voids adequately and spontaneously. No BM.  Diet/Appetite: Low saturated fat with 2400mg sodium.  Access: L piv, saline locked.  Activity: Up to commode with standby assist.  Pain: Denies.    Will continue with plan of care and notify MD of any changes.       "

## 2017-12-16 NOTE — PROGRESS NOTES
"  Cardiology  Progress Note    Courtney Anderson ([id number]) admitted on 12/13/2017 12/16/2017    Subjective   Feeling better and challenging herself to move around the unit. Clear mental status    Objective   Most recent vital signs:  /67 (BP Location: Left arm)  Pulse 79  Temp 98  F (36.7  C) (Oral)  Resp 24  Ht 1.626 m (5' 4\")  Wt (!) 179.9 kg (396 lb 11.2 oz)  SpO2 96%  BMI 68.09 kg/m2  Temp:  [97.7  F (36.5  C)-98.6  F (37  C)] 98  F (36.7  C)  Heart Rate:  [69-91] 83  Resp:  [24-28] 24  BP: ()/(65-78) 103/67  FiO2 (%):  [55 %] 55 %  SpO2:  [90 %-98 %] 96 %  Wt Readings from Last 2 Encounters:   12/16/17 (!) 179.9 kg (396 lb 11.2 oz)     Physical exam:  General: AAOx3, no clubbing/cyanosis, 1+ edema, Can't appreciate JV  HEENT:  Supple   Cardiac: RRR. No m/r/g. Normal S1, S2.   Pulm: CTAB  Abd: +BS, soft, non distended, non tender  Skin: No new rash/petechiae  MSK: No new deformities  Neuro:  No new focal deficits    Labs (Past three days):  Reviewed and remarkable for: reviewed    Imaging/procedure results:   Reviewed and remarkable for:   Recent Results (from the past 48 hour(s))   XR Chest Port 1 View    Narrative    XR CHEST PORT 1 VW  12/16/2017 8:59 AM      HISTORY: sob, diuresing with 10L urine now, see improvement;     COMPARISON: Chest x-ray on 12/13/2017 and chest CT on 12/13/2017    FINDINGS:   Single portable frontal view of the chest was obtained.   Support devices:None.     Stable enlarged cardiomediastinal silhouette. No pleural effusion.  Interval decrease in bilateral mid and lower lung diffuse opacities.  No acute osseous abnormality. Visualized upper abdomen unremarkable.  No pneumothorax.      Impression    IMPRESSION:  1. Interval decrease in bilateral mid and lower lung diffuse  opacities.  2. Unchanged enlarged cardiac mediastinal silhouette.    I have personally reviewed the examination and initial interpretation  and I agree with the findings.    BHASKAR PETERSEN MD "       Assessment & Plan   Courtney Anderson is a 47 year old female who presents with no significant past medical history who comes infor 2-3 weeks of worsening shortness of breath with minimal exertion, acutely worse over the past 48 hours.     Changes today  - d/c antibiotics  - BiPAP at night or VBG pCO2 > 75 or confusion  - lasix 40 mg iv tid  - liberalize SpO2 as hypercarbia limit oxygenation    # Acute on chronic hypoxemic and hypercarbic RS failure  Driven by OHS and CHF. Getting diuresis with lasix and diamox. No A-a gradient as hypoxia driven by hypercarbia (PAlveolarO2 = 57)  - SpO2 goal > 88 with activities  - will follow VBG for CO2, goal ~ 60-70  - BiPAP at night or VBG > 75    # New diagnosis of clinical CHF, unclear EF  # Obesity hypoventilation syndrome  Patient presented with signs and symptoms of volume overload and SOB. Very difficult to assess fluid status and lung by physical exam. BNP elevated 1600, CT without PE. On admission date, RS very tenuous concerning despite UOP ~4L raising concerns for other etiology. However, time proven that this is largely from volume overload since septic work up was negative. Pulm consult appreciate recs.  - lasix 40 mg iv tid + diamox 250 mg daily  - will need sleep medicine f/u     # Afib with RVR  Unclear onset. Likely as a response to volume overload syndrome and pulmonary htn. CHADVASC = 1 (female). Likely higher risk than other average patient  - pharmacy to dose warfarin  - metoprolol 50 mg bid     # Morbid obesity  Requiring extra medical care and appliances     FEN: cardiac diet  DVT Prophylaxis:  warfarin  GI Prophylaxis: not indicated  Disposition: PT/OT  Code Status: FULL    Staffed with Dr.Can Meghan Jon MD  PGY2 Internal Medicine  [phone]

## 2017-12-16 NOTE — PLAN OF CARE
Problem: Patient Care Overview  Goal: Plan of Care/Patient Progress Review  Outcome: No Change  [id number]:    Neuro: A&Ox4.  Cardiac: Afib, HR 70s-80s   Respiratory: Per team, oximask during day, Bipap at night, keep sats > 88%. O2 90% on 8L oximask.  GI/: Adequate urine output.   Diet/appetite: Cardiac diet, po intake fair   Activity:  SBA  Pain: Denies   Skin: Intact, no new deficits noted. Vinay extremities with edema throughout  LDA's: Left PIV, saline locked.      Plan: Continue with POC. Notify primary team with changes.

## 2017-12-17 LAB
ANION GAP SERPL CALCULATED.3IONS-SCNC: 4 MMOL/L (ref 3–14)
BASE EXCESS BLDV CALC-SCNC: 12.5 MMOL/L
BASE EXCESS BLDV CALC-SCNC: 13.4 MMOL/L
BUN SERPL-MCNC: 17 MG/DL (ref 7–30)
CALCIUM SERPL-MCNC: 8.5 MG/DL (ref 8.5–10.1)
CHLORIDE SERPL-SCNC: 97 MMOL/L (ref 94–109)
CO2 SERPL-SCNC: 39 MMOL/L (ref 20–32)
CREAT SERPL-MCNC: 0.76 MG/DL (ref 0.52–1.04)
GFR SERPL CREATININE-BSD FRML MDRD: 81 ML/MIN/1.7M2
GLUCOSE SERPL-MCNC: 88 MG/DL (ref 70–99)
HCO3 BLDV-SCNC: 41 MMOL/L (ref 21–28)
HCO3 BLDV-SCNC: 43 MMOL/L (ref 21–28)
INR PPP: 1.51 (ref 0.86–1.14)
MAGNESIUM SERPL-MCNC: 2 MG/DL (ref 1.6–2.3)
O2/TOTAL GAS SETTING VFR VENT: 55 %
O2/TOTAL GAS SETTING VFR VENT: ABNORMAL %
OXYHGB MFR BLDV: 57 %
PCO2 BLDV: 76 MM HG (ref 40–50)
PCO2 BLDV: 82 MM HG (ref 40–50)
PH BLDV: 7.33 PH (ref 7.32–7.43)
PH BLDV: 7.35 PH (ref 7.32–7.43)
PO2 BLDV: 35 MM HG (ref 25–47)
PO2 BLDV: 57 MM HG (ref 25–47)
POTASSIUM SERPL-SCNC: 3.6 MMOL/L (ref 3.4–5.3)
POTASSIUM SERPL-SCNC: 3.8 MMOL/L (ref 3.4–5.3)
SODIUM SERPL-SCNC: 140 MMOL/L (ref 133–144)

## 2017-12-17 PROCEDURE — 84132 ASSAY OF SERUM POTASSIUM: CPT

## 2017-12-17 PROCEDURE — 25000132 ZZH RX MED GY IP 250 OP 250 PS 637: Performed by: INTERNAL MEDICINE

## 2017-12-17 PROCEDURE — 83735 ASSAY OF MAGNESIUM: CPT

## 2017-12-17 PROCEDURE — 25000128 H RX IP 250 OP 636: Performed by: STUDENT IN AN ORGANIZED HEALTH CARE EDUCATION/TRAINING PROGRAM

## 2017-12-17 PROCEDURE — 85610 PROTHROMBIN TIME: CPT | Performed by: INTERNAL MEDICINE

## 2017-12-17 PROCEDURE — 25000132 ZZH RX MED GY IP 250 OP 250 PS 637: Performed by: STUDENT IN AN ORGANIZED HEALTH CARE EDUCATION/TRAINING PROGRAM

## 2017-12-17 PROCEDURE — 36415 COLL VENOUS BLD VENIPUNCTURE: CPT | Performed by: INTERNAL MEDICINE

## 2017-12-17 PROCEDURE — 82803 BLOOD GASES ANY COMBINATION: CPT | Performed by: STUDENT IN AN ORGANIZED HEALTH CARE EDUCATION/TRAINING PROGRAM

## 2017-12-17 PROCEDURE — 36415 COLL VENOUS BLD VENIPUNCTURE: CPT | Performed by: STUDENT IN AN ORGANIZED HEALTH CARE EDUCATION/TRAINING PROGRAM

## 2017-12-17 PROCEDURE — 80048 BASIC METABOLIC PNL TOTAL CA: CPT | Performed by: STUDENT IN AN ORGANIZED HEALTH CARE EDUCATION/TRAINING PROGRAM

## 2017-12-17 PROCEDURE — 12000006 ZZH R&B IMCU INTERMEDIATE UMMC

## 2017-12-17 PROCEDURE — 82805 BLOOD GASES W/O2 SATURATION: CPT | Performed by: INTERNAL MEDICINE

## 2017-12-17 PROCEDURE — 36415 COLL VENOUS BLD VENIPUNCTURE: CPT

## 2017-12-17 PROCEDURE — 25000128 H RX IP 250 OP 636: Performed by: INTERNAL MEDICINE

## 2017-12-17 PROCEDURE — 99232 SBSQ HOSP IP/OBS MODERATE 35: CPT | Mod: GC | Performed by: INTERNAL MEDICINE

## 2017-12-17 PROCEDURE — 40000275 ZZH STATISTIC RCP TIME EA 10 MIN

## 2017-12-17 PROCEDURE — 94660 CPAP INITIATION&MGMT: CPT

## 2017-12-17 RX ORDER — WARFARIN SODIUM 5 MG/1
10 TABLET ORAL
Status: COMPLETED | OUTPATIENT
Start: 2017-12-17 | End: 2017-12-17

## 2017-12-17 RX ADMIN — FUROSEMIDE 40 MG: 10 INJECTION, SOLUTION INTRAVENOUS at 07:36

## 2017-12-17 RX ADMIN — POTASSIUM CHLORIDE 20 MEQ: 750 TABLET, EXTENDED RELEASE ORAL at 00:24

## 2017-12-17 RX ADMIN — METOPROLOL TARTRATE 50 MG: 50 TABLET, FILM COATED ORAL at 07:36

## 2017-12-17 RX ADMIN — FUROSEMIDE 40 MG: 10 INJECTION, SOLUTION INTRAVENOUS at 13:37

## 2017-12-17 RX ADMIN — METOPROLOL TARTRATE 50 MG: 50 TABLET, FILM COATED ORAL at 21:08

## 2017-12-17 RX ADMIN — POTASSIUM CHLORIDE 20 MEQ: 750 TABLET, EXTENDED RELEASE ORAL at 07:36

## 2017-12-17 RX ADMIN — ACETAMINOPHEN 650 MG: 325 TABLET, FILM COATED ORAL at 00:27

## 2017-12-17 RX ADMIN — POTASSIUM CHLORIDE 20 MEQ: 750 TABLET, EXTENDED RELEASE ORAL at 23:20

## 2017-12-17 RX ADMIN — FUROSEMIDE 40 MG: 10 INJECTION, SOLUTION INTRAVENOUS at 21:03

## 2017-12-17 RX ADMIN — WARFARIN SODIUM 10 MG: 5 TABLET ORAL at 17:47

## 2017-12-17 RX ADMIN — ACETAMINOPHEN 650 MG: 325 TABLET, FILM COATED ORAL at 23:30

## 2017-12-17 RX ADMIN — ACETAZOLAMIDE 250 MG: 500 INJECTION, POWDER, LYOPHILIZED, FOR SOLUTION INTRAVENOUS at 07:37

## 2017-12-17 ASSESSMENT — ACTIVITIES OF DAILY LIVING (ADL)
ADLS_ACUITY_SCORE: 11

## 2017-12-17 ASSESSMENT — PAIN DESCRIPTION - DESCRIPTORS: DESCRIPTORS: ACHING

## 2017-12-17 NOTE — PROGRESS NOTES
"  Cardiology  Progress Note    Courtney Anderson ([id number]) admitted on 12/13/2017 12/17/2017    Subjective   Feeling much better and was able to walk around the unit yesterday. While VBG pCO2=82, the patient is mentating well. Still endorsed SOB with activities; thus not to her baseline    Objective   Most recent vital signs:  BP 91/65 (BP Location: Right arm)  Pulse 79  Temp 98.1  F (36.7  C) (Oral)  Resp 26  Ht 1.626 m (5' 4\")  Wt (!) 175.7 kg (387 lb 5.6 oz)  SpO2 96%  BMI 66.49 kg/m2  Temp:  [97.8  F (36.6  C)-99.2  F (37.3  C)] 98.1  F (36.7  C)  Heart Rate:  [76-93] 76  Resp:  [17-26] 26  BP: ()/(51-97) 91/65  FiO2 (%):  [55 %] 55 %  SpO2:  [91 %-97 %] 96 %  Wt Readings from Last 2 Encounters:   12/17/17 (!) 175.7 kg (387 lb 5.6 oz)     Physical exam:  General: AAOx3, no clubbing/cyanosis, 1+ edema, Can't appreciate JV  HEENT:  Supple   Cardiac: RRR. No m/r/g. Normal S1, S2.   Pulm: CTAB  Abd: +BS, soft, non distended, non tender  Skin: No new rash/petechiae  MSK: No new deformities  Neuro:  No new focal deficits    Labs (Past three days):  Reviewed and remarkable for: reviewed    Imaging/procedure results:   Reviewed and remarkable for:   Recent Results (from the past 48 hour(s))   XR Chest Port 1 View    Narrative    XR CHEST PORT 1 VW  12/16/2017 8:59 AM      HISTORY: sob, diuresing with 10L urine now, see improvement;     COMPARISON: Chest x-ray on 12/13/2017 and chest CT on 12/13/2017    FINDINGS:   Single portable frontal view of the chest was obtained.   Support devices:None.     Stable enlarged cardiomediastinal silhouette. No pleural effusion.  Interval decrease in bilateral mid and lower lung diffuse opacities.  No acute osseous abnormality. Visualized upper abdomen unremarkable.  No pneumothorax.      Impression    IMPRESSION:  1. Interval decrease in bilateral mid and lower lung diffuse  opacities.  2. Unchanged enlarged cardiac mediastinal silhouette.    I have personally reviewed the " examination and initial interpretation  and I agree with the findings.    BHASKAR PETERSEN MD       Assessment & Plan   Courtney Anderson is a 47 year old female who presents with no significant past medical history who comes infor 2-3 weeks of worsening shortness of breath with minimal exertion, acutely worse over the past 48 hours.     Changes today  - lower SpO2 goal to >84% to allow hypoxic drive to ventilate (discussed with pulm)  - BiPAP at night or  Any nap  - lasix 40 mg iv tid    # Acute on chronic hypoxemic and hypercarbic RS failure  Driven by OHS and CHF. Getting diuresis with lasix and diamox. No A-a gradient as hypoxia driven by hypercarbia (PAlveolarO2 = 57). Apart from IV diuresis, will also allow hypoxic drive to urge the patient to ventilate if tolerated  - SpO2 goal > 84% with activities  - VBG in am while with BiPAP and off BiPAP to follow CO2 improvement of ventilation  - BiPAP at night or nap, no excuse    # New diagnosis of clinical CHF, unclear EF  # Obesity hypoventilation syndrome  Patient presented with signs and symptoms of volume overload and SOB. Very difficult to assess fluid status and lung by physical exam. BNP elevated 1600, CT without PE. On admission date, RS very tenuous concerning despite UOP ~4L raising concerns for other etiology. However, time proven that this is largely from volume overload since septic work up was negative. Pulm consult appreciate recs.  - lasix 40 mg iv tid + diamox 250 mg daily  - will need sleep medicine f/u     # Afib with RVR  Unclear onset. Likely as a response to volume overload syndrome and pulmonary htn. CHADVASC = 1 (female). Likely higher risk than other average patient  - pharmacy to dose warfarin  - metoprolol 50 mg bid     # Morbid obesity  Requiring extra medical care and appliances     FEN: cardiac diet  DVT Prophylaxis:  warfarin  GI Prophylaxis: not indicated  Disposition: PT/OT  Code Status: FULL    Staffed with Dr.Can Meghan Jon,  MD  PGY2 Internal Medicine  [phone]

## 2017-12-17 NOTE — PLAN OF CARE
"Problem: Patient Care Overview  Goal: Plan of Care/Patient Progress Review  Outcome: No Change  /86 (BP Location: Right arm)  Pulse 79  Temp 97.8  F (36.6  C) (Axillary)  Resp 17  Ht 1.626 m (5' 4\")  Wt (!) 175.7 kg (387 lb 5.6 oz)  SpO2 97%  BMI 66.49 kg/m2    Cognitive: A&Ox4.   Cardiac: A fib sustains 70-80's. -120's.   Resp: 7L oxyplus during day. BIPAP 55% at night. VBG drawn with morning labs while BIPAP on. Orders placed for VBG to be drawn while oxyplus mask on for comparison.   GI/: Voids adequately and spontaneously. No BM.  Diet/Appetite: Low saturated fat, 2400 mg sodium.  Skin: Peripheral edema.  Access: L PIV, saline locked.  Activity: Up with stand by assist to commode.  Pain: Generalized pain, Tylenol given x1.     20mEq potassium replaced.    Will continue with plan of care and notify MD of any changes.       "

## 2017-12-17 NOTE — PLAN OF CARE
Problem: Patient Care Overview  Goal: Plan of Care/Patient Progress Review  Pt with stable vitals, atrial fib on monitor, 02 7L via oxiplus mask during day, BiPap 02 55% in use later this afternoon,  goal to keep 02 sats >88%; Pt diuresing well post lasix, pt up walking in hallway, tolerated well, up in chair and up to commode with standby assist; fair appetite, Continue to monitor vitals, labs, respiratory status as per plan of care, see also flowsheets for details

## 2017-12-18 ENCOUNTER — APPOINTMENT (OUTPATIENT)
Dept: PHYSICAL THERAPY | Facility: CLINIC | Age: 47
DRG: 291 | End: 2017-12-18
Payer: COMMERCIAL

## 2017-12-18 LAB
ANION GAP SERPL CALCULATED.3IONS-SCNC: 3 MMOL/L (ref 3–14)
BASE EXCESS BLDV CALC-SCNC: 14 MMOL/L
BUN SERPL-MCNC: 15 MG/DL (ref 7–30)
CALCIUM SERPL-MCNC: 8.7 MG/DL (ref 8.5–10.1)
CHLORIDE SERPL-SCNC: 95 MMOL/L (ref 94–109)
CO2 SERPL-SCNC: 42 MMOL/L (ref 20–32)
CREAT SERPL-MCNC: 0.83 MG/DL (ref 0.52–1.04)
GFR SERPL CREATININE-BSD FRML MDRD: 73 ML/MIN/1.7M2
GLUCOSE SERPL-MCNC: 98 MG/DL (ref 70–99)
HCO3 BLDV-SCNC: 44 MMOL/L (ref 21–28)
INR PPP: 2.23 (ref 0.86–1.14)
LACTATE BLD-SCNC: 2.1 MMOL/L (ref 0.7–2)
O2/TOTAL GAS SETTING VFR VENT: 55 %
PCO2 BLDV: 81 MM HG (ref 40–50)
PH BLDV: 7.34 PH (ref 7.32–7.43)
PO2 BLDV: 23 MM HG (ref 25–47)
POTASSIUM SERPL-SCNC: 3.7 MMOL/L (ref 3.4–5.3)
SODIUM SERPL-SCNC: 140 MMOL/L (ref 133–144)

## 2017-12-18 PROCEDURE — 97530 THERAPEUTIC ACTIVITIES: CPT | Mod: GP | Performed by: REHABILITATION PRACTITIONER

## 2017-12-18 PROCEDURE — 83605 ASSAY OF LACTIC ACID: CPT | Performed by: INTERNAL MEDICINE

## 2017-12-18 PROCEDURE — 97116 GAIT TRAINING THERAPY: CPT | Mod: GP | Performed by: REHABILITATION PRACTITIONER

## 2017-12-18 PROCEDURE — 85610 PROTHROMBIN TIME: CPT | Performed by: INTERNAL MEDICINE

## 2017-12-18 PROCEDURE — 80048 BASIC METABOLIC PNL TOTAL CA: CPT | Performed by: STUDENT IN AN ORGANIZED HEALTH CARE EDUCATION/TRAINING PROGRAM

## 2017-12-18 PROCEDURE — 25000132 ZZH RX MED GY IP 250 OP 250 PS 637: Performed by: HOSPITALIST

## 2017-12-18 PROCEDURE — 25000128 H RX IP 250 OP 636: Performed by: INTERNAL MEDICINE

## 2017-12-18 PROCEDURE — 36415 COLL VENOUS BLD VENIPUNCTURE: CPT | Performed by: INTERNAL MEDICINE

## 2017-12-18 PROCEDURE — 94660 CPAP INITIATION&MGMT: CPT

## 2017-12-18 PROCEDURE — 36415 COLL VENOUS BLD VENIPUNCTURE: CPT | Performed by: STUDENT IN AN ORGANIZED HEALTH CARE EDUCATION/TRAINING PROGRAM

## 2017-12-18 PROCEDURE — 25000128 H RX IP 250 OP 636: Performed by: STUDENT IN AN ORGANIZED HEALTH CARE EDUCATION/TRAINING PROGRAM

## 2017-12-18 PROCEDURE — 82803 BLOOD GASES ANY COMBINATION: CPT | Performed by: STUDENT IN AN ORGANIZED HEALTH CARE EDUCATION/TRAINING PROGRAM

## 2017-12-18 PROCEDURE — 12000006 ZZH R&B IMCU INTERMEDIATE UMMC

## 2017-12-18 PROCEDURE — 25000132 ZZH RX MED GY IP 250 OP 250 PS 637: Performed by: INTERNAL MEDICINE

## 2017-12-18 PROCEDURE — 25000132 ZZH RX MED GY IP 250 OP 250 PS 637: Performed by: STUDENT IN AN ORGANIZED HEALTH CARE EDUCATION/TRAINING PROGRAM

## 2017-12-18 PROCEDURE — 97161 PT EVAL LOW COMPLEX 20 MIN: CPT | Mod: GP | Performed by: REHABILITATION PRACTITIONER

## 2017-12-18 PROCEDURE — 25000132 ZZH RX MED GY IP 250 OP 250 PS 637

## 2017-12-18 PROCEDURE — 40000193 ZZH STATISTIC PT WARD VISIT: Performed by: REHABILITATION PRACTITIONER

## 2017-12-18 PROCEDURE — 99232 SBSQ HOSP IP/OBS MODERATE 35: CPT | Mod: GC | Performed by: INTERNAL MEDICINE

## 2017-12-18 PROCEDURE — 40000275 ZZH STATISTIC RCP TIME EA 10 MIN

## 2017-12-18 RX ORDER — AMOXICILLIN 250 MG
1 CAPSULE ORAL 2 TIMES DAILY
Status: DISCONTINUED | OUTPATIENT
Start: 2017-12-18 | End: 2017-12-20 | Stop reason: HOSPADM

## 2017-12-18 RX ORDER — WARFARIN SODIUM 7.5 MG/1
7.5 TABLET ORAL
Status: COMPLETED | OUTPATIENT
Start: 2017-12-18 | End: 2017-12-18

## 2017-12-18 RX ORDER — POLYETHYLENE GLYCOL 3350 17 G/17G
17 POWDER, FOR SOLUTION ORAL DAILY
Status: DISCONTINUED | OUTPATIENT
Start: 2017-12-18 | End: 2017-12-20 | Stop reason: HOSPADM

## 2017-12-18 RX ADMIN — ACETAZOLAMIDE 250 MG: 500 INJECTION, POWDER, LYOPHILIZED, FOR SOLUTION INTRAVENOUS at 09:11

## 2017-12-18 RX ADMIN — SENNOSIDES AND DOCUSATE SODIUM 1 TABLET: 8.6; 5 TABLET ORAL at 19:56

## 2017-12-18 RX ADMIN — WARFARIN SODIUM 7.5 MG: 7.5 TABLET ORAL at 19:55

## 2017-12-18 RX ADMIN — FUROSEMIDE 40 MG: 10 INJECTION, SOLUTION INTRAVENOUS at 09:11

## 2017-12-18 RX ADMIN — POLYETHYLENE GLYCOL 3350 17 G: 17 POWDER, FOR SOLUTION ORAL at 12:02

## 2017-12-18 RX ADMIN — FUROSEMIDE 40 MG: 10 INJECTION, SOLUTION INTRAVENOUS at 15:39

## 2017-12-18 RX ADMIN — FUROSEMIDE 40 MG: 10 INJECTION, SOLUTION INTRAVENOUS at 19:56

## 2017-12-18 RX ADMIN — ACETAMINOPHEN 650 MG: 325 TABLET, FILM COATED ORAL at 09:16

## 2017-12-18 RX ADMIN — METOPROLOL TARTRATE 50 MG: 50 TABLET, FILM COATED ORAL at 09:11

## 2017-12-18 RX ADMIN — ACETAMINOPHEN 650 MG: 325 TABLET, FILM COATED ORAL at 21:24

## 2017-12-18 RX ADMIN — METOPROLOL TARTRATE 50 MG: 50 TABLET, FILM COATED ORAL at 19:55

## 2017-12-18 ASSESSMENT — ACTIVITIES OF DAILY LIVING (ADL)
ADLS_ACUITY_SCORE: 11

## 2017-12-18 NOTE — PROGRESS NOTES
"  Cardiology  Progress Note    Courtney Anderson ([id number]) admitted on 12/13/2017 12/18/2017    Subjective   Anxious to leave the hospital for the holidays  Feels better with diuresis  Would like to walk around the unit more    Objective   Most recent vital signs:  /65 (BP Location: Right arm)  Pulse 80  Temp 98.5  F (36.9  C) (Axillary)  Resp 20  Ht 1.626 m (5' 4\")  Wt (!) 171.1 kg (377 lb 3.3 oz)  SpO2 96%  BMI 64.75 kg/m2  Temp:  [97.5  F (36.4  C)-98.5  F (36.9  C)] 98.5  F (36.9  C)  Pulse:  [72-80] 80  Heart Rate:  [76-92] 82  Resp:  [20-26] 20  BP: ()/(65-81) 106/65  FiO2 (%):  [55 %] 55 %  SpO2:  [91 %-96 %] 96 %  Wt Readings from Last 2 Encounters:   12/18/17 (!) 171.1 kg (377 lb 3.3 oz)     Physical exam:  General: AAOx3, no clubbing/cyanosis, 1+ edema, Can't appreciate JV  HEENT:  Supple   Cardiac: RRR. No m/r/g. Normal S1, S2.   Pulm: CTAB  Abd: +BS, soft, non distended, non tender  Skin: No new rash/petechiae  MSK: No new deformities  Neuro:  No new focal deficits    Labs (Past three days):  Reviewed and remarkable for: reviewed    Imaging/procedure results:   Reviewed and remarkable for:   Recent Results (from the past 48 hour(s))   XR Chest Port 1 View    Narrative    XR CHEST PORT 1 VW  12/16/2017 8:59 AM      HISTORY: sob, diuresing with 10L urine now, see improvement;     COMPARISON: Chest x-ray on 12/13/2017 and chest CT on 12/13/2017    FINDINGS:   Single portable frontal view of the chest was obtained.   Support devices:None.     Stable enlarged cardiomediastinal silhouette. No pleural effusion.  Interval decrease in bilateral mid and lower lung diffuse opacities.  No acute osseous abnormality. Visualized upper abdomen unremarkable.  No pneumothorax.      Impression    IMPRESSION:  1. Interval decrease in bilateral mid and lower lung diffuse  opacities.  2. Unchanged enlarged cardiac mediastinal silhouette.    I have personally reviewed the examination and initial " interpretation  and I agree with the findings.    BHASKAR PETERSEN MD       Assessment & Plan   Courtney Anderson is a 47 year old female who presents with no significant past medical history who comes infor 2-3 weeks of worsening shortness of breath with minimal exertion, acutely worse over the past 48 hours.     Changes today  - Continue IV lasix today  - Oral diuretics tomorrow  - Tentative discharge to home Wednesday  - Plan for sleep study evaluation, needs home CPAP    # Acute on chronic hypoxemic and hypercarbic RS failure  Driven by OHS and CHF. Getting diuresis with lasix and diamox. No A-a gradient as hypoxia driven by hypercarbia (PAlveolarO2 = 57). Apart from IV diuresis, will also allow hypoxic drive to urge the patient to ventilate if tolerated  - SpO2 goal > 84% with activities  - BiPAP at night or nap    # New diagnosis of clinical CHF, unclear EF  # Obesity hypoventilation syndrome  Patient presented with signs and symptoms of volume overload and SOB. Very difficult to assess fluid status and lung by physical exam. BNP elevated 1600, CT without PE. On admission date, RS very tenuous concerning despite UOP ~4L raising concerns for other etiology. However, time proven that this is largely from volume overload since septic work up was negative. Pulm consult appreciate recs.  - lasix 40 mg iv tid + diamox 250 mg daily  - will need sleep medicine f/u     # Afib with RVR  Unclear onset. Likely as a response to volume overload syndrome and pulmonary htn. CHADVASC = 1 (female). Likely higher risk than other average patient  - pharmacy to dose warfarin, therapeutic today  - metoprolol 50 mg bid     # Morbid obesity  Requiring extra medical care and appliances     FEN: cardiac diet  DVT Prophylaxis:  warfarin, therapeutic today  GI Prophylaxis: not indicated  Disposition: PT/OT  Code Status: FULL    Germain Petersen MD  Cardiology Fellow    I very much appreciated the opportunity to see and assess Mrs Courtney Anderson in  the hospital with CV Fellow Dr Humphreys and Cards resident team.     Today I visited with Mrs Noble for 20 min discuussing her HF synmptoms, SOB and obesity. May be good candidate for Bariatric surgery if this has not already been excluded    I agree with the note above which summarizes my findings and current recommendations.  Please do not hesitate to contact my office if you have any questions or concerns.      Wilber Rhodes MD  Cardiac Arrhythmia Service  [facility]  [phone]

## 2017-12-18 NOTE — PLAN OF CARE
"Problem: Patient Care Overview  Goal: Plan of Care/Patient Progress Review  Outcome: No Change  /65 (BP Location: Right arm)  Pulse 80  Temp 98.5  F (36.9  C) (Axillary)  Resp 20  Ht 1.626 m (5' 4\")  Wt (!) 171.1 kg (377 lb 3.3 oz)  SpO2 96%  BMI 64.75 kg/m2  Neuro: A&Ox4.   Cardiac: SR. VSS. Soft BP at times.    Respiratory: Sating at 95% on Bipap 50% fio2 .during the day pt is on   GI/: Adequate urine output. Passing flatus, LBM was on the 13th of dec.   Diet/appetite: Tolerating low fat - 2400NA+ diet. Eating well.  Activity:  SBA  Pain: At acceptable level on current regimen.   Skin:  no new deficits noted. Pt refused alfonso-care. RN educated pt on the importance of wiping after urinating but pt refused to adhere to education and refuses to wipe or have RN wipe alfonso-area.   LDA's: 1 PIv   Plan:  Pt has been diuresing very well, lasix seems to be effective, pt denies SOB this morning while transferring to the commode.  Pt has also lost several pounds noted this morning while weighting pt.  Continue with POC. Notify primary team with changes.    Intake/Output Summary (Last 24 hours) at 12/18/17 0624  Last data filed at 12/18/17 0600   Gross per 24 hour   Intake              360 ml   Output             8050 ml   Net            -7690 ml           "

## 2017-12-18 NOTE — PROGRESS NOTES
12/18/17 1030   Quick Adds   Quick Adds Student Supervision-Eval Only   Type of Visit Initial PT Evaluation   Student Supervision-Eval Only    Student Supervision Direct supervision provided;On-site supervision provided   Living Environment   Lives With child(mark), dependent;grandchild(mark);child(mark), adult   Living Arrangements house   Number of Stairs to Enter Home 0  (Ramp)   Number of Stairs Within Home 10   Stair Railings at Home inside, present on left side;inside, present on right side   Transportation Available car   Living Environment Comment Pt lives with her 2 grown children and 2 grandchildren. She lives in a multi-level home with her bathroom and bedroom on the second floor with 10-12 stairs to the top.   Self-Care   Dominant Hand right   Usual Activity Tolerance moderate   Current Activity Tolerance poor   Regular Exercise no   Equipment Currently Used at Home none   Activity/Exercise/Self-Care Comment Pt was independent with all self cares prior to episode of care.   Functional Level Prior   Ambulation 0-->independent   Transferring 0-->independent   Toileting 0-->independent   Bathing 0-->independent   Dressing 0-->independent   Eating 0-->independent   Communication 0-->understands/communicates without difficulty   Swallowing 0-->swallows foods/liquids without difficulty   Cognition 0 - no cognition issues reported   Fall history within last six months no   Which of the above functional risks had a recent onset or change? 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 She primarily performs the IADLs and ADLs for her patient(s). Pt did not have to utilize portable oxygen prior to this episode of care.   General Information   Onset of Illness/Injury or Date of Surgery - Date 12/13/17   Referring Physician Meghan Jon MD   Pertinent History of Current Problem (include personal factors and/or comorbidities that impact the POC)  47 year old female who presents with no significant past medical history who comes infor 2-3 weeks of worsening shortness of breath with minimal exertion, acutely worse over the past 48 hours.     Precautions/Limitations fall precautions  (<84% SpO2)   General Observations Activity Order: Ambulate   Cognitive Status Examination   Orientation orientation to person, place and time   Level of Consciousness alert   Follows Commands and Answers Questions 100% of the time   Personal Safety and Judgment intact   Memory intact   Pain Assessment   Patient Currently in Pain No   Integumentary/Edema   Integumentary/Edema other (describe)   Integumentary/Edema Comments Pt has 2+/3+ pitting edema bilaterally in LE. Pt notes that she has been dropping considerable weight due to medications. No skin changes noted upon examination.    Posture    Posture Forward head position;Protracted shoulders   Range of Motion (ROM)   ROM Comment WFL - yair UE/LE   Strength   Strength Comments Grossly 4+/5, 5/5 in yair LE myotomes.   Bed Mobility   Bed Mobility Comments Modified Independent:  bed scooting due to heavy UE use.   Transfer Skills   Transfer Comments Modified Independent: Sit <>stand with heavy upper extremity use.    Gait   Gait Comments Pt has decreased gait speed with short steps with feet slightly externally rotated and somewhat narrow base of support. No LOB or unsteadiness noted.   Balance   Balance Comments Formal balance assessment not completed. Pt is steady with all transfers and ambulation. Pt has good sitting and standing balance.   Sensory Examination   Sensory Perception no deficits were identified   Coordination   Coordination no deficits were identified   General Therapy Interventions   Planned Therapy Interventions bed mobility training;gait training;neuromuscular re-education;strengthening;transfer training;progressive activity/exercise   Intervention Comments Stair negotiation   Clinical Impression   Criteria for  "Skilled Therapeutic Intervention yes, treatment indicated   PT Diagnosis Impaired functional mobility   Influenced by the following impairments Shortness of breath, decreased aerobic endurance, decreased LE strength/endurance   Functional limitations due to impairments Decreased ambulation potential and decreased activity tolerance   Clinical Presentation Stable/Uncomplicated   Clinical Presentation Rationale Clinical Judgement, [facility]   Clinical Decision Making (Complexity) Low complexity   Therapy Frequency` 5 times/week   Predicted Duration of Therapy Intervention (days/wks) 1 week   Anticipated Equipment Needs at Discharge (Pulse oximeter)   Anticipated Discharge Disposition Home with Outpatient Therapy   Risk & Benefits of therapy have been explained Yes   Patient, Family & other staff in agreement with plan of care Yes   Clinical Impression Comments Pt eval completed; tx indicated.   [facility] [employer] TM \"6 Clicks\"   2016, Trustees of [facility], under license to [employer].  All rights reserved.   6 Clicks Short Forms Basic Mobility Inpatient Short Form   [facility] [employer]  \"6 Clicks\" V.2 Basic Mobility Inpatient Short Form   1. Turning from your back to your side while in a flat bed without using bedrails? 4 - None   2. Moving from lying on your back to sitting on the side of a flat bed without using bedrails? 4 - None   3. Moving to and from a bed to a chair (including a wheelchair)? 4 - None   4. Standing up from a chair using your arms (e.g., wheelchair, or bedside chair)? 4 - None   5. To walk in hospital room? 4 - None   6. Climbing 3-5 steps with a railing? 3 - A Little   Basic Mobility Raw Score (Score out of 24.Lower scores equate to lower levels of function) 23   Total Evaluation Time   Total Evaluation Time (Minutes) 8     "

## 2017-12-18 NOTE — PROGRESS NOTES
Care Coordinator Progress Note     Admission Date/Time:  12/13/2017  Attending MD:  Danny Paige MD     Data  Chart reviewed, discussed with interdisciplinary team.   Patient was admitted for:    SOB (shortness of breath)  Hypoxia  New onset a-fib (H)  Hypercarbia  Atrial fibrillation, unspecified type (H).    Concerns with insurance coverage for discharge needs: None identifed  Current Living Situation: Patient lives with her Children  Support System: Grown Children  Services Involved:   Transportation: Family  Barriers to Discharge: None identifed    Coordination of Care and Referrals:   Establish Primary Care:  [facility]  #[phone]                                                                                                        Dr Grace Carter                                                                                                        [address] [address]     12/26/17  @ 10am      Assessment  Pt admitted with CHF and hypoxia has been receiving IV diureses, with 42 pound wt loss since admission.  Pt is currently of 1 liter of oxygen with goal to wean to room air by discharge in approx 2 days.  I have met with Pt to introduce myself and care coordinator role. Prior to admission Pt was living with her grown children and grandchildren in [place]. Pt has been independent PTA, she works as a PCA, drives herself.  Pt does not have a Primary Care MD, she prefers to establish care near her home at [facility] in [place] [facility] [place]. I have scheduled the above appointment which is the soonest she can be seen on 12/26 with INR draw.       Plan  Anticipated Discharge Date:  2-3 days  Anticipated Discharge Plan:  D/C to home with Out Patient follow up    Aliza Garcia RN   6B care coordinator #[phone]

## 2017-12-18 NOTE — DISCHARGE SUMMARY
Cardiology Discharge Summary  Courtney Anderson MRN: [id number]  1970  Primary care provider: No Ref-Primary, Physician  ___________________________________          Date of Admission:  12/13/2017  Date of Discharge:  12/20/2017  Admitting Physician:  Danny Paige MD  Discharge Physician:  Wilber Rhodes MD  Discharging Service:  Cardiology 1     Primary Provider: No Ref-Primary, Physician         Reason for Admission:   Courtney Anderson is a 47 year old female who presents with no significant past medical history who comes infor 2-3 weeks of worsening shortness of breath with minimal exertion, acutely worse over the past 48 hours prior to admission.    For more details please see H&P written by Dr. De Oliveira on 12/14/2017.          Discharge Diagnosis:   Acute on chronic hypoxemia and hypercarbic respiratory failure  Congestive Heart failure, unclear EF  Obesity hypoventilation syndrome         Procedures & Significant Findings:   TTE, failed to visualize  CTPE, no PE         Consultations:   Pulmonary medicine  [facility] Evaluation         Relevant Results:   Cardiac MRI, Pending    ECHO - Unable to obtain proper windows to make a reasonable evaluation.    CT Chest with Contrast 12/14/2017    IMPRESSION:  1. No visualized pulmonary embolism. Examination is limited by  suboptimal opacification and visualization of peripheral pulmonary  arteries.  2. No other acute findings in the chest.  3. Small amount of nonspecific fluid adjacent to the liver.  Nonspecific subcutaneous edema.  4. Small right adrenal nodule.          Hospital Course by Problem:    # Acute on chronic hypoxemic and hypercarbic RS failure  Driven by OHS and CHF. Initially started patient on IV diuresis, transitioned to home regimen of oral lasix and diamox. No A-a gradient as hypoxia driven by hypercarbia (PAlveolarO2 = 57). Apart from IV diuresis, we have also allowed hypoxic drive (in  "consultation with pulmonary), keep SpO2>84% to allow the patient to ventilate. Patient tolerated this goal well.  - SpO2 goal > 84% with activities  - Started Lasix 80 mg AM; 40 mg PM; with diamox 250 mg daily, 20 mEq of K daily as well.  - Follow up with pulmonary as an outpatient for sleep evaluation.     # New diagnosis of clinical CHF, unclear EF  # Obesity hypoventilation syndrome  Patient presented with signs and symptoms of volume overload and SOB. Very difficult to assess fluid status and lung by physical exam. BNP elevated 1600, CT without PE. On admission date, RS very tenuous concerning despite UOP ~4L raising concerns for other etiology. However, time proven that this is largely from volume overload since septic work up was negative.   - Cardiac MRI as outpatient as patient was unable to have a useful ECHO as inpatient.  - Admit weight 419 lbs, BNP 1908  - Discharge weight 366 lbs Lbs  - Discharge with diuretics as above  - Follow up with pulmonary as an outpatient for sleep evaluation.      # Afib with RVR  Unclear onset. Likely as a response to volume overload syndrome and pulmonary htn. CHADVASC = 1 (female). Likely higher risk than other average patient, so we started her on anticoagulation.  - Follow up with coumadin clinic  - Started Metoprolol 50 mg bid  - Cardiology follow up to consideration of DCCV after patient has diuresed to dry weight and MRI has demonstrated no thrombus or she has had sufficient anticoagulation.      Follow up plan summary  1. PCP scheduled to establish care  2. [facility] clinic scheduled  3. Pulmonary and sleep medicine consult placed  4. Obtain cardiac MRI as patient was unable to get ECHO as inpatient  5. PCP to follow up incidental findings on CT scan (adrenal nodule)    Physical Exam on day of Discharge:  Blood pressure 112/81, pulse 79, temperature 98.4  F (36.9  C), temperature source Oral, resp. rate 26, height 1.626 m (5' 4\"), weight (!) 175.7 kg (387 lb 5.6 oz), SpO2 " 92 %.     General: AAOx3, no clubbing/cyanosis, 1+ edema, Can't appreciate JV  HEENT:  Supple   Cardiac: IRIR. No m/r/g. Normal S1, S2.   Pulm: CTAB  Abd: +BS, soft, non distended, non tender  Skin: No new rash/petechiae  MSK: No new deformities  Neuro:  No new focal deficits    Lines/Tubes:  None            Discharge Medications:   There are no discharge medications for this patient.           Discharge Instructions and Follow-Up:   No discharge procedures on file.          Discharge Disposition:   To home         Condition on Discharge:   Discharge condition: Stable   Code status on discharge: Full Code        Date of service: 12/17/2017    The patient was discussed with Dr. Rhodes who agrees with my assessment and plan.    Josef Nicholson MD PhD  Internal Medicine Resident, PGY3  P: [phone]     I very much appreciated the opportunity to see and assess Mrs Courtney Anderson in the hospital at time of discharge with CV Fellow Dr Humphreys, Resident Dr Nicholson and Cards 1  Resident team    Mrs Anderson feels well and is anxious to return home. I visited with her for 25 min today emphasizing the need to take her meds, minimize salr and watch for any fluid accumulation. Daily weights may not be effective in her but bear watching in any case. Sleep apnea is a major concern from a cardiac function and A Fib perspective. We will try to expedite sleep study as CPAP could be very important for her in the long run.    I agree with the note above which summarizes my findings and current recommendations.  Please do not hesitate to contact my office if you have any questions or concerns.      Wilber Rhodes MD  Cardiac Arrhythmia Service  [facility]  [phone]

## 2017-12-18 NOTE — DISCHARGE INSTRUCTIONS
INR draw  Friday 12/22/17  [facility]    Lab  [address] [address]    Oxygen Provider:  Arranged through [facility], contact number [phone].  If you have any questions for concerns please call the oxygen company directly.          [facility]  [phone]     Fax #[phone] [address] [address]    Dr Santi Carter     12/26/17   10am  INR management    Goal INR 2-3

## 2017-12-18 NOTE — PLAN OF CARE
Problem: Patient Care Overview  Goal: Plan of Care/Patient Progress Review  Discharge Planner PT   Patient plan for discharge: Home with family   Current status: Pt eval completed; tx indicated. VSS with decreased SpO2 with ambulation to 83% however with 2-4 min. Rest in chair would return to low 90 s on 1L/min O2.  Pt ambulated 6 x 60 - 80 feet without AD and therapist supervision to improve safe ambulation within safe O2 saturation levels to improve aerobic endurance. Therapist advised holding off on LE wrapping at this time due to Pt s SOB with activity and  current successful reduction in bilateral LE edema within last 2 days. Pt educated and performed seated LE exercises to facilitate venous return. Pt performed bed scooting and supine <> sit with modified independence due to increased exertion and heavy upper extremity use. Pt encouraged to walk with nursing 4 times per day to help with edema reduction and to improve activity tolerance.  Barriers to return to prior living situation: medical needs, level of assist, impaired functional mobility, stair negotiation  Recommendations for discharge: Home with OP physical therapy  Rationale for recommendations: Pt would benefit from continued therapy services to safely improve aerobic endurance, safe ambulation, and tolerance for daily activities.       Entered by: Rama Ramos 12/18/2017 3:49 PM

## 2017-12-18 NOTE — PLAN OF CARE
Problem: Patient Care Overview  Goal: Discharge Needs Assessment  12/18/17 6063     Mera Mcclelland-Registered Nurse (Nursing)  Patient and daughter attended Heart failure and Warfarin class.

## 2017-12-18 NOTE — PLAN OF CARE
Problem: Patient Care Overview  Goal: Plan of Care/Patient Progress Review  Outcome: Improving  D/I/A:  Neuro: A&Ox4. Calls appropriately, lethargic this AM, awake and visiting w/ family this afternoon.   CV: A-fib HR 70s-100s w/ activity. BP stable.   Pulm: MD orders to decrease O2 w/ new SPO2 goal of >84%. Currently on 2-3L oxymask, O2 sats 80s-low 90s. Tachypneic w/ activity. Lungs dim/ intermittently wheezy.  GI: 2g Na diet, good appetite, No BM  : Voids w/ commode. Scheduled lasix, large urine/output. Net negative 5L today.  P: Continue to monitor respiratory status, notify MDs of any issues.

## 2017-12-19 ENCOUNTER — APPOINTMENT (OUTPATIENT)
Dept: PHYSICAL THERAPY | Facility: CLINIC | Age: 47
DRG: 291 | End: 2017-12-19
Payer: COMMERCIAL

## 2017-12-19 LAB
ANION GAP SERPL CALCULATED.3IONS-SCNC: 6 MMOL/L (ref 3–14)
BUN SERPL-MCNC: 16 MG/DL (ref 7–30)
CALCIUM SERPL-MCNC: 8.7 MG/DL (ref 8.5–10.1)
CHLORIDE SERPL-SCNC: 97 MMOL/L (ref 94–109)
CHOLEST SERPL-MCNC: 128 MG/DL
CO2 SERPL-SCNC: 38 MMOL/L (ref 20–32)
CREAT SERPL-MCNC: 0.75 MG/DL (ref 0.52–1.04)
GFR SERPL CREATININE-BSD FRML MDRD: 82 ML/MIN/1.7M2
GLUCOSE SERPL-MCNC: 101 MG/DL (ref 70–99)
HDLC SERPL-MCNC: 28 MG/DL
INR PPP: 2.63 (ref 0.86–1.14)
LDLC SERPL CALC-MCNC: 73 MG/DL
NONHDLC SERPL-MCNC: 100 MG/DL
POTASSIUM SERPL-SCNC: 3.4 MMOL/L (ref 3.4–5.3)
SODIUM SERPL-SCNC: 140 MMOL/L (ref 133–144)
TRIGL SERPL-MCNC: 132 MG/DL

## 2017-12-19 PROCEDURE — 85610 PROTHROMBIN TIME: CPT | Performed by: INTERNAL MEDICINE

## 2017-12-19 PROCEDURE — 80061 LIPID PANEL: CPT | Performed by: STUDENT IN AN ORGANIZED HEALTH CARE EDUCATION/TRAINING PROGRAM

## 2017-12-19 PROCEDURE — 40000275 ZZH STATISTIC RCP TIME EA 10 MIN

## 2017-12-19 PROCEDURE — 21400006 ZZH R&B CCU INTERMEDIATE UMMC

## 2017-12-19 PROCEDURE — 97530 THERAPEUTIC ACTIVITIES: CPT | Mod: GP | Performed by: REHABILITATION PRACTITIONER

## 2017-12-19 PROCEDURE — 25000132 ZZH RX MED GY IP 250 OP 250 PS 637: Performed by: HOSPITALIST

## 2017-12-19 PROCEDURE — 36415 COLL VENOUS BLD VENIPUNCTURE: CPT | Performed by: STUDENT IN AN ORGANIZED HEALTH CARE EDUCATION/TRAINING PROGRAM

## 2017-12-19 PROCEDURE — 25000132 ZZH RX MED GY IP 250 OP 250 PS 637: Performed by: STUDENT IN AN ORGANIZED HEALTH CARE EDUCATION/TRAINING PROGRAM

## 2017-12-19 PROCEDURE — 40000193 ZZH STATISTIC PT WARD VISIT: Performed by: REHABILITATION PRACTITIONER

## 2017-12-19 PROCEDURE — 99232 SBSQ HOSP IP/OBS MODERATE 35: CPT | Mod: GC | Performed by: INTERNAL MEDICINE

## 2017-12-19 PROCEDURE — 25000132 ZZH RX MED GY IP 250 OP 250 PS 637

## 2017-12-19 PROCEDURE — 97116 GAIT TRAINING THERAPY: CPT | Mod: GP | Performed by: REHABILITATION PRACTITIONER

## 2017-12-19 PROCEDURE — 80048 BASIC METABOLIC PNL TOTAL CA: CPT | Performed by: STUDENT IN AN ORGANIZED HEALTH CARE EDUCATION/TRAINING PROGRAM

## 2017-12-19 PROCEDURE — 94660 CPAP INITIATION&MGMT: CPT

## 2017-12-19 RX ORDER — FUROSEMIDE 40 MG
40 TABLET ORAL ONCE
Status: COMPLETED | OUTPATIENT
Start: 2017-12-19 | End: 2017-12-19

## 2017-12-19 RX ORDER — WARFARIN SODIUM 7.5 MG/1
7.5 TABLET ORAL
Status: COMPLETED | OUTPATIENT
Start: 2017-12-19 | End: 2017-12-19

## 2017-12-19 RX ORDER — FUROSEMIDE 40 MG
80 TABLET ORAL EVERY 24 HOURS
Status: DISCONTINUED | OUTPATIENT
Start: 2017-12-20 | End: 2017-12-20 | Stop reason: HOSPADM

## 2017-12-19 RX ORDER — FUROSEMIDE 40 MG
40 TABLET ORAL 2 TIMES DAILY
Status: DISCONTINUED | OUTPATIENT
Start: 2017-12-19 | End: 2017-12-19

## 2017-12-19 RX ORDER — FUROSEMIDE 80 MG
80 TABLET ORAL EVERY 24 HOURS
Status: DISCONTINUED | OUTPATIENT
Start: 2017-12-19 | End: 2017-12-19

## 2017-12-19 RX ORDER — FUROSEMIDE 40 MG
40 TABLET ORAL EVERY 24 HOURS
Status: DISCONTINUED | OUTPATIENT
Start: 2017-12-20 | End: 2017-12-20 | Stop reason: HOSPADM

## 2017-12-19 RX ORDER — ACETAZOLAMIDE 250 MG/1
250 TABLET ORAL DAILY
Status: DISCONTINUED | OUTPATIENT
Start: 2017-12-19 | End: 2017-12-20 | Stop reason: HOSPADM

## 2017-12-19 RX ADMIN — METOPROLOL TARTRATE 50 MG: 50 TABLET, FILM COATED ORAL at 20:18

## 2017-12-19 RX ADMIN — SENNOSIDES AND DOCUSATE SODIUM 1 TABLET: 8.6; 5 TABLET ORAL at 20:18

## 2017-12-19 RX ADMIN — METOPROLOL TARTRATE 50 MG: 50 TABLET, FILM COATED ORAL at 07:50

## 2017-12-19 RX ADMIN — POTASSIUM CHLORIDE 20 MEQ: 750 TABLET, EXTENDED RELEASE ORAL at 20:17

## 2017-12-19 RX ADMIN — FUROSEMIDE 40 MG: 40 TABLET ORAL at 15:53

## 2017-12-19 RX ADMIN — SENNOSIDES AND DOCUSATE SODIUM 1 TABLET: 8.6; 5 TABLET ORAL at 07:51

## 2017-12-19 RX ADMIN — FUROSEMIDE 40 MG: 40 TABLET ORAL at 11:58

## 2017-12-19 RX ADMIN — ACETAZOLAMIDE 250 MG: 250 TABLET ORAL at 10:07

## 2017-12-19 RX ADMIN — FUROSEMIDE 40 MG: 40 TABLET ORAL at 07:50

## 2017-12-19 RX ADMIN — WARFARIN SODIUM 7.5 MG: 7.5 TABLET ORAL at 18:37

## 2017-12-19 ASSESSMENT — ACTIVITIES OF DAILY LIVING (ADL)
ADLS_ACUITY_SCORE: 11

## 2017-12-19 NOTE — PROGRESS NOTES
"  Cardiology  Progress Note    Courtney Anderson ([id number]) admitted on 12/13/2017 12/19/2017    Subjective   No major events overnight  Breathing better  Anxious to leave hospital    Objective   Most recent vital signs:  /90 (BP Location: Right arm)  Pulse 80  Temp 97.5  F (36.4  C) (Oral)  Resp 14  Ht 1.626 m (5' 4\")  Wt (!) 169.1 kg (372 lb 12.8 oz)  SpO2 93%  BMI 63.99 kg/m2  Temp:  [97.4  F (36.3  C)-99  F (37.2  C)] 97.5  F (36.4  C)  Heart Rate:  [] 93  Resp:  [14-22] 14  BP: ()/(66-90) 113/90  FiO2 (%):  [55 %] 55 %  SpO2:  [90 %-97 %] 93 %  Wt Readings from Last 2 Encounters:   12/19/17 (!) 169.1 kg (372 lb 12.8 oz)     Physical exam:  General: AAOx3, no clubbing/cyanosis, 1+ edema, Can't appreciate JV   HEENT:  Supple   Cardiac: RRR. No m/r/g. Normal S1, S2.   Pulm: CTAB  Abd: +BS, soft, non distended, non tender  Skin: No new rash/petechiae  MSK: No new deformities  Neuro:  No new focal deficits    Labs (Past three days):  Reviewed and remarkable for: reviewed    Imaging/procedure results:   Reviewed and remarkable for:   No results found for this or any previous visit (from the past 48 hour(s)).    Assessment & Plan   Courtney Anderson is a 47 year old female who presents with no significant past medical history who comes infor 2-3 weeks of worsening shortness of breath with minimal exertion, acutely worse over the past 48 hours.     Changes today  - Change to orals  - Plan for DC tomorrow  - Adjust oral dose to keep net negative 500 cc/1000 cc/24 hours  - Replete K    # Acute on chronic hypoxemic and hypercarbic RS failure  Driven by OHS and CHF. Getting diuresis with lasix and diamox. No A-a gradient as hypoxia driven by hypercarbia (PAlveolarO2 = 57). Apart from IV diuresis, will also allow hypoxic drive to urge the patient to ventilate if tolerated  - SpO2 goal > 84% with activities  - BiPAP at night or nap  - O2 saturations are improving slowly but patient still requires oxygen " with exertion with a walking o2 saturation of < 85%.  Resting O2 saturations < 86%.    # New diagnosis of clinical CHF, unclear EF  # Obesity hypoventilation syndrome  Patient presented with signs and symptoms of volume overload and SOB. Very difficult to assess fluid status and lung by physical exam. BNP elevated 1600, CT without PE. On admission date, RS very tenuous concerning despite UOP ~4L raising concerns for other etiology. However, time proven that this is largely from volume overload since septic work up was negative. Pulm consult appreciate recs.  - Change lasix and diamox to oral  - will need sleep medicine f/u as outpatient     # Afib with RVR  Unclear onset. Likely as a response to volume overload syndrome and pulmonary htn. CHADVASC = 1 (female). Likely higher risk than other average patient.  Now rate controlled.  - pharmacy to dose warfarin, therapeutic today  - metoprolol 50 mg bid  - Consider outpatient DCCV once she is therapeutic on warfarin for 30 days and more euvolemic     # Morbid obesity  Requiring extra medical care and appliances  Currently not interested in bariatric surgical options     FEN: cardiac diet  DVT Prophylaxis:  warfarin, therapeutic today  GI Prophylaxis: not indicated  Disposition: PT/OT  Code Status: FULL    Outpatient follow up next week Tuesday at [facility] and we will coordinate an INR check to occur on Friday of this week.    Germain Humphreys MD  Cardiology Fellow    I very much appreciated the opportunity to see and assess Mrs Courtney Anderson in the hospital with CV Fellow Dr Humphreys and Cards 1  Resident team. Mrs Anderson is clinically stable but obesity raises a substantial long-term risk. The concept of bariaytric surgey was raised but she is not inclined to pursue that option at this time.    I agree with the note above which summarizes my findings and current recommendations.  Please do not hesitate to contact my office if you have any questions or concerns.      Wilber GERARDO  Meagan XIAO  Cardiac Arrhythmia Service  [facility]  [phone]

## 2017-12-19 NOTE — PROGRESS NOTES
Brief Pulmonary Note    Chart reviewed. Patient stable. ABG at baseline. Follow-up with sleep clinic on discharge.    Pulmonary will sign-off.

## 2017-12-19 NOTE — PLAN OF CARE
Problem: Patient Care Overview  Goal: Plan of Care/Patient Progress Review  Patient alert and oriented x 4, patient denies any pain/discomfort, reports she feels back at her baseline. Pt on 1L oxygen via oxiplus (refuses nasal cannula d/t discomfort) @ rest, up to 3 L with activity and Bipap with sleep. Pt. Does desat to low 80's on room air. Pt. Up with standby assist in her room and ambulated in the hallways with therapy today. Pt. Tolerating cardiac diet with no issues. Teaching done throughout the day regarding patient's new medications and heart failure diagnosis-pt. Will continue to need a lot of education.

## 2017-12-19 NOTE — PLAN OF CARE
Problem: Patient Care Overview  Goal: Plan of Care/Patient Progress Review  Outcome: No Change  Neuro: A&Ox4. Denies numbness, tingling, or altered mental status. Motor strength WNL.  Cardiac: Controlled Afib. Heart sounds distant upon auscultation. Pulses present 2+ all extremities. Dependent moderate edema in bilateral lower legs and feet. VSS.    Respiratory: Sating 97% on 55% FiO2 BiPAP. Bilateral lung sounds clear diminished/distant. Coughing and deep breathing with encouragement. Non-productive. Using BiPAP all night. Reported SOB when taken off BiPAP for examination.  GI/: Adequate urine output. No BM. Bowel sounds hypoactive.   Diet/appetite: Cardiac diet low fat low cholesterol. NPO at midnight per orders for lipid panel.   Activity:  Assist of 1 stand by to bathroom.  Pain: Denied any sign of pain throughout shift.  Skin: Dry/Intact. Minor bruises and stretch marks on abdomen. Blanchable redness around BiPAP mask. Tattoo lower left leg.   LDA's: Left PIV in forearm. Patent. Saline locked. WNL.    Plan: Continue with POC. Patient needs medication teaching reinforcement. Plan to change IV Lasix to oral. Goal is 82% O2 sat with activity. Notify primary team with changes.

## 2017-12-19 NOTE — PLAN OF CARE
Problem: Patient Care Overview  Goal: Plan of Care/Patient Progress Review  Pt alert and oriented x4. VSS. Pt on 1 L oxy plus mask. Oxygen saturation above 84%. Teaching done today by patient learning center for heart failure and coumadin. Additional documents and teaching for CHF done as well by primary RN. Plan to possible discharge tomorrow. Pt diuresing well and will be able to switch to PO medication tomorrow. Will continue to monitor per plan of care.

## 2017-12-19 NOTE — PLAN OF CARE
Problem: Patient Care Overview  Goal: Plan of Care/Patient Progress Review  Patient has been assessed for Home Oxygen needs. Oxygen readings:    *Pulse oximetry (SpO2) = 86% on room air at rest while awake.    *SpO2 improved to 90% on 1 liters/minute at rest.    *SpO2 = 83% on room air during activity/with exercise.    *SpO2 improved to 85% on 1 liters/minute during activity/with exercise.

## 2017-12-19 NOTE — PLAN OF CARE
Problem: Patient Care Overview  Goal: Plan of Care/Patient Progress Review  Outcome: Improving  Temp: 98.1  F (36.7  C) Temp src: Oral BP: 104/70 Pulse: 80 Heart Rate: 86 Resp: 22 SpO2: 90 % O2 Device: BiPAP/CPAP Oxygen Delivery: 1 LPM  Pt A&Ox4. Afebrile. VSS. Afib rates . On 1L Oxiplus w/ activity, O2 sats 88-92%. CPAP HS at 55% FiO2. Lung sounds clear/diminished in bases. Tylenol given x1 for generalized pain. Up w/SBA. Lasix 40mg given IV, Large UOP. No BM, active bowel sounds. Plan to transition to PO lasix in am. Continue to monitor and notify MD of any changes.

## 2017-12-19 NOTE — CONSULTS
"Mrs Anderson is a 47 year old female presenting with increasing SOB. CORE consult requested. Mrs Anderson is currently admitted with acute/chronic diastolic heart failure.     Mrs. Anderson was provided with a CHF book.  I reviewed the importance of daily weights, 2 gm sodium diet, 2L fluid restriction and compliance with medications upon hospital discharge.  CORE contact phone numbers provided and patient is encouraged to call with any questions or concerns, including any weight gain or loss of 2 or more pounds in 24 hours or 5 or more pounds in 1 week.      Pt is hesitant about scheduling an appointment with CORE. She needed some clarification on the difference between CORE and Primary MD follow up appointments. Pt stated she would attend intial CORE appointment and then hopes to transfer to a different health system who is more convenient to her [facility] home. Appointment made in CORE clinic on  at [phone]/900 with Jolynn CARRANZA.  I will follow-up with the patient at that time, sooner if needed.  Thank you for the consult.    Lucy Eduardo RN BSN   Cardiology Care Coordinator - C.O.R.E. [facility]  Questions and [phone]  First press #1 for the [employer] and then press #3 for \"Medical Advise\" to reach us Cardiology Nurses.      "

## 2017-12-19 NOTE — PLAN OF CARE
Problem: Patient Care Overview  Goal: Plan of Care/Patient Progress Review  Discharge Planner PT   Patient plan for discharge: Home with Assist  Current status:   Pt performed sit <>stand with mod independent due to heavy UE use and independent bathroom use. Pt ambulated with SBA 1 x 140 ft and 1 x 70 ft on 1 L/min O2 on portable Oxy Plus mask to improve safe mobilization with SpO2 levels in mid 80s with ambulation and returning to low 90's with rest. Pt ambulated with SBA 1 x 140 ft and 1 x 70 ft on RA to assess need for O2 with ambulation. Pt's SpO2 levels were at high 70's/low 80's after ambulation and returned to high 80's with rest. Therapist placed Pt back on 1L/min O2 to adhere to SpO2 levels >84% with activity. Pt performed 2 x 3 step stair ascent/descent with single railing with step to and alternating step to approach with SBA. Pt demonstrated safe use of stairs with increased fatigue noted with stair descent. Pt tolerated longer ambulation distances in today s session, however does fatigue quickly and with concern for low O2 saturation should be on supplemental O2 at this time for safe ambulation in home and community distances.   Barriers to return to prior living situation: medical needs, stair negotiation,  impaired functional mobility due to SOB  Recommendations for discharge: Home with assist and OP Cardiac Rehab  Rationale for recommendations: Pt would benefit from continued education and to improve activity tolerance with comprehensive cardiac rehab program. Pt has scheduled CORE consult upcoming.       Entered by: Rama Ramos 12/19/2017 3:31 PM

## 2017-12-19 NOTE — PROVIDER NOTIFICATION
Cards 1 notified at 2040: pt triggered Sepsis protocol. Lactic acid drawn with result of 2.1  No new orders at this time.

## 2017-12-20 ENCOUNTER — DOCUMENTATION ONLY (OUTPATIENT)
Dept: CARDIOLOGY | Facility: CLINIC | Age: 47
End: 2017-12-20

## 2017-12-20 ENCOUNTER — APPOINTMENT (OUTPATIENT)
Dept: PHYSICAL THERAPY | Facility: CLINIC | Age: 47
DRG: 291 | End: 2017-12-20
Payer: COMMERCIAL

## 2017-12-20 VITALS
RESPIRATION RATE: 20 BRPM | SYSTOLIC BLOOD PRESSURE: 93 MMHG | BODY MASS INDEX: 50.02 KG/M2 | WEIGHT: 293 LBS | DIASTOLIC BLOOD PRESSURE: 71 MMHG | HEART RATE: 80 BPM | TEMPERATURE: 97.5 F | OXYGEN SATURATION: 91 % | HEIGHT: 64 IN

## 2017-12-20 DIAGNOSIS — I48.20 CHRONIC ATRIAL FIBRILLATION (H): Primary | ICD-10-CM

## 2017-12-20 LAB
ANION GAP SERPL CALCULATED.3IONS-SCNC: 5 MMOL/L (ref 3–14)
BACTERIA SPEC CULT: NO GROWTH
BACTERIA SPEC CULT: NO GROWTH
BUN SERPL-MCNC: 16 MG/DL (ref 7–30)
CALCIUM SERPL-MCNC: 9 MG/DL (ref 8.5–10.1)
CHLORIDE SERPL-SCNC: 100 MMOL/L (ref 94–109)
CO2 SERPL-SCNC: 36 MMOL/L (ref 20–32)
CREAT SERPL-MCNC: 0.72 MG/DL (ref 0.52–1.04)
GFR SERPL CREATININE-BSD FRML MDRD: 86 ML/MIN/1.7M2
GLUCOSE SERPL-MCNC: 98 MG/DL (ref 70–99)
INR PPP: 2.97 (ref 0.86–1.14)
POTASSIUM SERPL-SCNC: 3.4 MMOL/L (ref 3.4–5.3)
SODIUM SERPL-SCNC: 141 MMOL/L (ref 133–144)
SPECIMEN SOURCE: NORMAL
SPECIMEN SOURCE: NORMAL

## 2017-12-20 PROCEDURE — 25000132 ZZH RX MED GY IP 250 OP 250 PS 637: Performed by: INTERNAL MEDICINE

## 2017-12-20 PROCEDURE — 80048 BASIC METABOLIC PNL TOTAL CA: CPT | Performed by: STUDENT IN AN ORGANIZED HEALTH CARE EDUCATION/TRAINING PROGRAM

## 2017-12-20 PROCEDURE — 40000275 ZZH STATISTIC RCP TIME EA 10 MIN

## 2017-12-20 PROCEDURE — 99238 HOSP IP/OBS DSCHRG MGMT 30/<: CPT | Mod: GC | Performed by: INTERNAL MEDICINE

## 2017-12-20 PROCEDURE — 25000132 ZZH RX MED GY IP 250 OP 250 PS 637: Performed by: STUDENT IN AN ORGANIZED HEALTH CARE EDUCATION/TRAINING PROGRAM

## 2017-12-20 PROCEDURE — 25000132 ZZH RX MED GY IP 250 OP 250 PS 637: Performed by: HOSPITALIST

## 2017-12-20 PROCEDURE — 25000132 ZZH RX MED GY IP 250 OP 250 PS 637

## 2017-12-20 PROCEDURE — 97116 GAIT TRAINING THERAPY: CPT | Mod: GP

## 2017-12-20 PROCEDURE — 97530 THERAPEUTIC ACTIVITIES: CPT | Mod: GP

## 2017-12-20 PROCEDURE — 94660 CPAP INITIATION&MGMT: CPT

## 2017-12-20 PROCEDURE — 85610 PROTHROMBIN TIME: CPT | Performed by: INTERNAL MEDICINE

## 2017-12-20 PROCEDURE — 40000193 ZZH STATISTIC PT WARD VISIT

## 2017-12-20 RX ORDER — FUROSEMIDE 40 MG
40 TABLET ORAL EVERY 24 HOURS
Qty: 30 TABLET | Refills: 0 | Status: SHIPPED | OUTPATIENT
Start: 2017-12-20

## 2017-12-20 RX ORDER — POTASSIUM CHLORIDE 1500 MG/1
20 TABLET, EXTENDED RELEASE ORAL DAILY
Qty: 30 TABLET | Refills: 0 | Status: SHIPPED | OUTPATIENT
Start: 2017-12-20

## 2017-12-20 RX ORDER — MAGNESIUM CARB/ALUMINUM HYDROX 105-160MG
296 TABLET,CHEWABLE ORAL ONCE
Status: DISCONTINUED | OUTPATIENT
Start: 2017-12-20 | End: 2017-12-20 | Stop reason: HOSPADM

## 2017-12-20 RX ORDER — FUROSEMIDE 80 MG
80 TABLET ORAL DAILY
Qty: 30 TABLET | Refills: 0 | Status: SHIPPED | OUTPATIENT
Start: 2017-12-20

## 2017-12-20 RX ORDER — METOPROLOL TARTRATE 50 MG
50 TABLET ORAL 2 TIMES DAILY
Qty: 60 TABLET | Refills: 0 | Status: SHIPPED | OUTPATIENT
Start: 2017-12-20

## 2017-12-20 RX ORDER — BISACODYL 10 MG
10 SUPPOSITORY, RECTAL RECTAL DAILY PRN
Status: DISCONTINUED | OUTPATIENT
Start: 2017-12-20 | End: 2017-12-20 | Stop reason: HOSPADM

## 2017-12-20 RX ORDER — WARFARIN SODIUM 5 MG/1
5 TABLET ORAL DAILY
Qty: 30 TABLET | Refills: 0 | Status: SHIPPED | OUTPATIENT
Start: 2017-12-20

## 2017-12-20 RX ORDER — LEVALBUTEROL INHALATION SOLUTION 1.25 MG/3ML
1.25 SOLUTION RESPIRATORY (INHALATION) EVERY 4 HOURS PRN
Qty: 270 ML | Refills: 0 | Status: SHIPPED | OUTPATIENT
Start: 2017-12-20

## 2017-12-20 RX ORDER — ACETAZOLAMIDE 250 MG/1
250 TABLET ORAL DAILY
Qty: 30 TABLET | Refills: 0 | Status: SHIPPED | OUTPATIENT
Start: 2017-12-20

## 2017-12-20 RX ORDER — WARFARIN SODIUM 5 MG/1
5 TABLET ORAL
Status: DISCONTINUED | OUTPATIENT
Start: 2017-12-20 | End: 2017-12-20 | Stop reason: HOSPADM

## 2017-12-20 RX ADMIN — FUROSEMIDE 80 MG: 40 TABLET ORAL at 09:32

## 2017-12-20 RX ADMIN — POTASSIUM CHLORIDE 20 MEQ: 750 TABLET, EXTENDED RELEASE ORAL at 09:33

## 2017-12-20 RX ADMIN — METOPROLOL TARTRATE 50 MG: 50 TABLET, FILM COATED ORAL at 09:33

## 2017-12-20 RX ADMIN — SENNOSIDES AND DOCUSATE SODIUM 1 TABLET: 8.6; 5 TABLET ORAL at 09:32

## 2017-12-20 RX ADMIN — ACETAMINOPHEN 650 MG: 325 TABLET, FILM COATED ORAL at 01:12

## 2017-12-20 RX ADMIN — FUROSEMIDE 40 MG: 40 TABLET ORAL at 12:06

## 2017-12-20 RX ADMIN — ACETAZOLAMIDE 250 MG: 250 TABLET ORAL at 09:32

## 2017-12-20 RX ADMIN — POLYETHYLENE GLYCOL 3350 17 G: 17 POWDER, FOR SOLUTION ORAL at 12:07

## 2017-12-20 NOTE — PLAN OF CARE
Problem: Patient Care Overview  Goal: Plan of Care/Patient Progress Review    Transfer  D; Patient transferred from 6B this evening. Admitted to hospital with newly diagnosed acute on chronic HF, new onset afib and started warfarin this admission.  I: Settled patient to 6C, replaced AM K. Discussed scheduled evening medications and explained that at discharge, all medications will be reviewed and patient will be given list of meds/times.   A; Afib 70s-120s, BP stable. ALEMAN but walked in hallway on 6C. Reports of desaturations on room air. Goal O2>84%. Currently using 2L oxyplus mask (does not like NC). Unclear if patient will dc on home oxygen. HF, warfarin teaching done at [facility], Core clinic RN visited with patient earlier today.  P: Monitor bowel status. Possible discharge tomorrow.

## 2017-12-20 NOTE — PROGRESS NOTES
Plan for pt to DC to home later today. Oxygen will be delivered to pt here prior to d/c. Requiring 3L NC to maintain sats >85% both with activity and at rest. Weight down again today-continues on lasix bid.   Plan for pt to have INR checked at [facility] this Friday 12/22. New PCP appt & CORE clinic appt set for next week. New rxs to be filled here prior to DC. Pt states her daughter will be able to provide transport home when paperwork & meds ready.

## 2017-12-20 NOTE — PLAN OF CARE
Problem: Patient Care Overview  Goal: Plan of Care/Patient Progress Review  PT / 6C  Discharge Planner PT   Patient plan for discharge:   Current status: Pt completes sit<>stand transfers with MOD I. Pt ambulates ~175ft x 2 with no AD + SBA, extended seated break taken between bouts 2/2 SOB. Pt ascends/descends 10 stairs with single handrail + SBA, extended seated break required upon completion of stairs 2/2 SOB. Pt educated on energy conservation techniques and strategies, provided handout. SpO2 on 3 L O2 ranging 88-94%.  Barriers to return to prior living situation: Medical needs, stair negotiation,  impaired functional mobility due to SOB.  Recommendations for discharge: Home with assist and OP Cardiac Rehab.  Rationale for recommendations: Pt would benefit from continued education and to improve activity tolerance with comprehensive cardiac rehab program. Pt has scheduled CORE consult upcoming.

## 2017-12-20 NOTE — PROGRESS NOTES
Received Oxygen referral at 10:23AM. Due to patients insurance being the [facility] we are unable to service the patient.   10:50am- spoke with patient and offered choice, patient was unsure of vendor gave her a few options of vendors. Patient wanted to use [employer].  10:53am- faxed all documentation to [employer]  11:17am- Received call from [facility] they are also unable to service the patient due to not being in contract with [facility].    11:20am- referral was faxed to [facility]  11:26am-Did intake with [facility] with Pam.   11:40am-Spoke with patient to inform her that [employer] was not in contract with her insurance but I have verified with [facility] they can bill patients insurance, patient is ok with using [facility] for her Oxygen services.   1:29am- I called [facility] to confirm everything was good to go and make sure they received documentation as I was waiting for call back from Pam, Spoke with Pam she did let me know they did not receive any documentation  1:31pm- refaxed documentation,  confirmed they received documentation at 1:45pm but needed to send it through billing to get ok for delivery.    2:47pm- spoke with Pam at  she confirmed portable tank was delivered to hospital and they will meet patient at home for setup.

## 2017-12-20 NOTE — PROGRESS NOTES
Care Coordinator- Discharge Planning     Admission Date/Time:  12/13/2017  Attending MD:  Danny Paige MD     Data  Date of initial CC assessment:  12/18/17  Chart reviewed, discussed with interdisciplinary team.   Patient was admitted for:   1. SOB (shortness of breath)    2. Hypoxia    3. New onset a-fib (H)    4. Hypercarbia    5. Atrial fibrillation, unspecified type (H)         Assessment  Pt medically stable for discharge to home today. Pt continues to require continuous oxygen. Referral made to [facility], they have arranged oxygen through [facility].  I have met with Pt today and instructed her to have INR drawn at the [facility] on 12/22/17. Ongoing anticoagulation will be managed at [facility]. I have faxed d/c orders to [facility], Pt will establish her Primary Care with Santi Carter.    Coordination of Care to arrange Primary Care at [facility] #[phone]  First visit 12/26/17 with INR      Plan  Anticipated Discharge Date:  12/20/17  Anticipated Discharge Plan:  D/C to home with outpt follow up    CTS Handoff completed:  Yes    Aliza Garcia RN

## 2017-12-20 NOTE — PLAN OF CARE
Problem: Patient Care Overview  Goal: Plan of Care/Patient Progress Review  Outcome: No Change  Pt a/o x 4 overnight. Appeared to sleep soundly between cares. VSS. Tylenol given for sore back. Tele afib with rate 80's to low 100's. Wore bipap overnight at 55% FiO2. Commode at bedside. Possible discharge today. Makes needs known.

## 2017-12-21 ENCOUNTER — CARE COORDINATION (OUTPATIENT)
Dept: CARE COORDINATION | Facility: CLINIC | Age: 47
End: 2017-12-21

## 2017-12-21 ENCOUNTER — CARE COORDINATION (OUTPATIENT)
Dept: CARDIOLOGY | Facility: CLINIC | Age: 47
End: 2017-12-21

## 2017-12-21 NOTE — PLAN OF CARE
Problem: Patient Care Overview  Goal: Plan of Care/Patient Progress Review  Physical Therapy Discharge Summary    Reason for therapy discharge:    Discharged to home.    Progress towards therapy goal(s). See goals on Care Plan in [facility] electronic health record for goal details.  Goals partially met.  Barriers to achieving goals:   discharge from facility.    Therapy recommendation(s):    Continued therapy is recommended.  Rationale/Recommendations:  to progress activity tolerance and endurance required for functional mobility.

## 2017-12-21 NOTE — PROGRESS NOTES
I called Courtney Anderson this morning to follow-up with her post-hospitalization discharged from [facility] on 12/20.         Medication Review:  I reviewed all her discharge medications with her and she had no questions regarding her medications.     Follow-up appointment review:  I reviewed Courtney Anderson follow-up appointments on 12/29 and she verbalized understanding. I reviewed the CORE clinic's phone number and to call with any increase of SOB, increased edema or swelling, and weight gain. She verbalizes understanding and agrees with plan of care. Lucy Eduardo RN

## 2017-12-21 NOTE — PROGRESS NOTES
Wants to know why she still needs to take lasix.  She was recently hospitalized with heart failure and fluid overload. She was aggressively diuresed with IV lasix.    Explained to patient that she will need to keep taking the lasix or the fluid she lost will return and she may end up back in the hospital.   Counseled on when to contact the doctor's office for heart failure    * Gain more than 2 pounds in one day or 5 pounds in one week  * Have increased shortness of breath  * Wake up short of breath or cannot sleep lying down  * Have increased swelling in your legs, ankles, or abdomen (belly)    Patient understands and agrees to take the lasix.

## 2017-12-22 ENCOUNTER — FCC EXTENDED DOCUMENTATION (OUTPATIENT)
Dept: CARDIOLOGY | Facility: CLINIC | Age: 47
End: 2017-12-22

## 2017-12-22 ENCOUNTER — HOSPITAL ENCOUNTER (OUTPATIENT)
Facility: CLINIC | Age: 47
Setting detail: SPECIMEN
Discharge: HOME | End: 2017-12-22
Attending: HOSPITALIST | Admitting: HOSPITALIST
Payer: COMMERCIAL

## 2017-12-22 DIAGNOSIS — I48.20 CHRONIC ATRIAL FIBRILLATION (H): Primary | ICD-10-CM

## 2017-12-22 DIAGNOSIS — I48.91 NEW ONSET A-FIB (H): ICD-10-CM

## 2017-12-22 LAB
ANION GAP SERPL CALCULATED.3IONS-SCNC: 8 MMOL/L (ref 3–14)
BUN SERPL-MCNC: 15 MG/DL (ref 7–30)
CALCIUM SERPL-MCNC: 8.7 MG/DL (ref 8.5–10.1)
CHLORIDE SERPL-SCNC: 103 MMOL/L (ref 94–109)
CO2 SERPL-SCNC: 28 MMOL/L (ref 20–32)
CREAT SERPL-MCNC: 0.75 MG/DL (ref 0.52–1.04)
GFR SERPL CREATININE-BSD FRML MDRD: 81 ML/MIN/1.7M2
GLUCOSE SERPL-MCNC: 146 MG/DL (ref 70–99)
INR PPP: 2.9 (ref 0.86–1.14)
POTASSIUM SERPL-SCNC: 3.4 MMOL/L (ref 3.4–5.3)
SODIUM SERPL-SCNC: 139 MMOL/L (ref 133–144)

## 2017-12-22 PROCEDURE — 36415 COLL VENOUS BLD VENIPUNCTURE: CPT | Performed by: HOSPITALIST

## 2017-12-22 PROCEDURE — 85610 PROTHROMBIN TIME: CPT | Performed by: HOSPITALIST

## 2017-12-22 NOTE — PROGRESS NOTES
Called patient.  No response.  No voicemail to leave message.  She did not get her INR drawn despite order and being asked to at time of discharge for warfarin management.  I will check throughout the weekend to see if this results as she will need her warfarin dosed accordingly.

## 2018-01-02 ENCOUNTER — PRE VISIT (OUTPATIENT)
Dept: CARDIOLOGY | Facility: CLINIC | Age: 48
End: 2018-01-02

## 2018-01-02 DIAGNOSIS — R06.02 SOB (SHORTNESS OF BREATH): Primary | ICD-10-CM

## 2018-01-02 DIAGNOSIS — I50.9 CHF (CONGESTIVE HEART FAILURE) (H): ICD-10-CM

## 2018-01-02 DIAGNOSIS — I48.91 ATRIAL FIBRILLATION (H): ICD-10-CM

## 2018-01-08 ENCOUNTER — MEDICAL CORRESPONDENCE (OUTPATIENT)
Dept: HEALTH INFORMATION MANAGEMENT | Facility: CLINIC | Age: 48
End: 2018-01-08

## 2019-01-18 ENCOUNTER — HOSPITAL ENCOUNTER (EMERGENCY)
Facility: CLINIC | Age: 49
Discharge: HOME | End: 2019-01-18
Attending: EMERGENCY MEDICINE | Admitting: EMERGENCY MEDICINE
Payer: COMMERCIAL

## 2019-01-18 VITALS
BODY MASS INDEX: 62.36 KG/M2 | OXYGEN SATURATION: 97 % | TEMPERATURE: 98.2 F | WEIGHT: 293 LBS | RESPIRATION RATE: 24 BRPM | DIASTOLIC BLOOD PRESSURE: 103 MMHG | SYSTOLIC BLOOD PRESSURE: 133 MMHG | HEART RATE: 88 BPM

## 2019-01-18 DIAGNOSIS — T16.2XXA FOREIGN BODY OF LEFT EAR, INITIAL ENCOUNTER: ICD-10-CM

## 2019-01-18 PROCEDURE — 99282 EMERGENCY DEPT VISIT SF MDM: CPT | Performed by: EMERGENCY MEDICINE

## 2019-01-18 PROCEDURE — 99282 EMERGENCY DEPT VISIT SF MDM: CPT | Mod: Z6 | Performed by: EMERGENCY MEDICINE

## 2019-01-18 ASSESSMENT — ENCOUNTER SYMPTOMS
SHORTNESS OF BREATH: 0
ARTHRALGIAS: 0
NECK STIFFNESS: 0
FEVER: 0
COLOR CHANGE: 0
EYE REDNESS: 0
DIFFICULTY URINATING: 0
HEADACHES: 0
ABDOMINAL PAIN: 0
CONFUSION: 0

## 2019-01-18 NOTE — ED PROVIDER NOTES
History     Chief Complaint   Patient presents with     Foreign Body in Ear     has a piece of cotton in her left ear for the past 1 hour     HPI  Courtney Anderson is a 48 year old female with a history of atrial fibrillation and congestive heart failure who presents for evaluation of foreign body sensation left ear.  Patient states that she was using a Q-tip to clean her ear and upon removal of the Q-tip noticed that some of the cotton was still in her ear.  She is tried putting water in her ear as well as using a tweezers without relief.  She notes mild decrease or fullness in her ear.  She denies bleeding from her ear or other ongoing symptoms such as tinnitus, dizziness.    I have reviewed the Medications, Allergies, Past Medical and Surgical History, and Social History in the Epic system.    Review of Systems   Constitutional: Negative for fever.   HENT: Positive for hearing loss. Negative for congestion, ear discharge and ear pain.    Eyes: Negative for redness.   Respiratory: Negative for shortness of breath.    Cardiovascular: Negative for chest pain.   Gastrointestinal: Negative for abdominal pain.   Genitourinary: Negative for difficulty urinating.   Musculoskeletal: Negative for arthralgias and neck stiffness.   Skin: Negative for color change.   Neurological: Negative for headaches.   Psychiatric/Behavioral: Negative for confusion.   All other systems reviewed and are negative.      Physical Exam   BP: (!) 133/103  Pulse: 88  Temp: 98.2  F (36.8  C)  Resp: 24  Weight: (!) 164.8 kg (363 lb 5 oz)  SpO2: 97 %      Physical Exam   HENT:   Left Ear: A foreign body ( Cotton) is present.       ED Course        Procedures           Left ear canal was irrigated with warm tap water and small piece of cotton retrieved.  Air was rechecked and there is no evidence of foreign body and ear canal otherwise appears clear.  Labs Ordered and Resulted from Time of ED Arrival Up to the Time of Departure from the ED - No data  to display         Assessments & Plan (with Medical Decision Making)   48-year-old female presents for evaluation of foreign body sensation left ear.  Patient had a small piece of cotton in the left ear canal which was retrieved using irrigation.  We discussed means in the future to address ceruminosis and foreign body sensation as well as itching and have recommended over-the-counter kits.    I have reviewed the nursing notes.    I have reviewed the findings, diagnosis, plan and need for follow up with the patient.       Medication List      There are no discharge medications for this visit.         Final diagnoses:   Foreign body of left ear, initial encounter       1/18/2019   [facility], [place], EMERGENCY DEPARTMENT     Joselito Maya MD  01/18/19 101

## 2019-01-18 NOTE — ED AVS SNAPSHOT
[facility], [place], Emergency Department  [address]  [facility] [address]  Phone:  [phone]  Fax:  [phone]                                    Courtney Anderson   MRN: [id number]    Department:  [facility], Emergency Department   Date of Visit:  1/18/2019           After Visit Summary Signature Page    I have received my discharge instructions, and my questions have been answered. I have discussed any challenges I see with this plan with the nurse or doctor.    ..........................................................................................................................................  Patient/Patient Representative Signature      ..........................................................................................................................................  Patient Representative Print Name and Relationship to Patient    ..................................................               ................................................  Date                                   Time    ..........................................................................................................................................  Reviewed by Signature/Title    ...................................................              ..............................................  Date                                               Time          22EPIC Rev 08/18        3